# Patient Record
Sex: MALE | Employment: FULL TIME | URBAN - METROPOLITAN AREA
[De-identification: names, ages, dates, MRNs, and addresses within clinical notes are randomized per-mention and may not be internally consistent; named-entity substitution may affect disease eponyms.]

---

## 2020-06-19 ENCOUNTER — TELEPHONE (OUTPATIENT)
Dept: NEUROSURGERY | Facility: CLINIC | Age: 40
End: 2020-06-19

## 2020-06-19 ENCOUNTER — TELEPHONE (OUTPATIENT)
Dept: NEUROLOGY | Facility: CLINIC | Age: 40
End: 2020-06-19

## 2020-11-04 DIAGNOSIS — Z00.00 HEALTH CARE MAINTENANCE: Primary | ICD-10-CM

## 2020-11-11 ENCOUNTER — TELEPHONE (OUTPATIENT)
Dept: FAMILY MEDICINE CLINIC | Facility: CLINIC | Age: 40
End: 2020-11-11

## 2020-11-12 ENCOUNTER — HOSPITAL ENCOUNTER (OUTPATIENT)
Dept: NON INVASIVE DIAGNOSTICS | Facility: CLINIC | Age: 40
Discharge: HOME | End: 2020-11-12

## 2020-11-12 ENCOUNTER — OFFICE VISIT (OUTPATIENT)
Dept: FAMILY MEDICINE CLINIC | Facility: CLINIC | Age: 40
End: 2020-11-12

## 2020-11-12 ENCOUNTER — TRANSCRIBE ORDERS (OUTPATIENT)
Dept: LAB | Facility: CLINIC | Age: 40
End: 2020-11-12

## 2020-11-12 ENCOUNTER — APPOINTMENT (OUTPATIENT)
Dept: LAB | Facility: CLINIC | Age: 40
End: 2020-11-12

## 2020-11-12 ENCOUNTER — HOSPITAL ENCOUNTER (OUTPATIENT)
Dept: ULTRASOUND IMAGING | Facility: HOSPITAL | Age: 40
Discharge: HOME | End: 2020-11-12

## 2020-11-12 ENCOUNTER — HOSPITAL ENCOUNTER (OUTPATIENT)
Dept: CT IMAGING | Facility: HOSPITAL | Age: 40
Discharge: HOME | End: 2020-11-12

## 2020-11-12 VITALS
WEIGHT: 183 LBS | BODY MASS INDEX: 27.74 KG/M2 | HEART RATE: 78 BPM | HEIGHT: 68 IN | RESPIRATION RATE: 12 BRPM | TEMPERATURE: 96.6 F | OXYGEN SATURATION: 99 % | SYSTOLIC BLOOD PRESSURE: 112 MMHG | DIASTOLIC BLOOD PRESSURE: 74 MMHG

## 2020-11-12 DIAGNOSIS — Z00.00 HEALTH CARE MAINTENANCE: ICD-10-CM

## 2020-11-12 DIAGNOSIS — B36.0 TINEA VERSICOLOR: ICD-10-CM

## 2020-11-12 DIAGNOSIS — Z00.00 HEALTH CARE MAINTENANCE: Primary | ICD-10-CM

## 2020-11-12 DIAGNOSIS — E78.2 MIXED HYPERLIPIDEMIA: ICD-10-CM

## 2020-11-12 DIAGNOSIS — M75.42 IMPINGEMENT SYNDROME OF LEFT SHOULDER: ICD-10-CM

## 2020-11-12 DIAGNOSIS — M79.672 LEFT FOOT PAIN: ICD-10-CM

## 2020-11-12 LAB
25(OH)D3 SERPL-MCNC: 50.9 NG/ML (ref 30–100)
ALBUMIN SERPL BCP-MCNC: 4 G/DL (ref 3.5–5)
ALP SERPL-CCNC: 79 U/L (ref 46–116)
ALT SERPL W P-5'-P-CCNC: 31 U/L (ref 12–78)
ANION GAP SERPL CALCULATED.3IONS-SCNC: 5 MMOL/L (ref 4–13)
AST SERPL W P-5'-P-CCNC: 17 U/L (ref 5–45)
ATRIAL RATE: 64 BPM
BACTERIA UR QL AUTO: NORMAL /HPF
BASOPHILS # BLD AUTO: 0.05 THOUSANDS/ΜL (ref 0–0.1)
BASOPHILS NFR BLD AUTO: 1 % (ref 0–1)
BILIRUB SERPL-MCNC: 0.82 MG/DL (ref 0.2–1)
BILIRUB UR QL STRIP: NEGATIVE
BUN SERPL-MCNC: 13 MG/DL (ref 5–25)
CALCIUM SERPL-MCNC: 9 MG/DL (ref 8.3–10.1)
CHEST PAIN STATEMENT: NORMAL
CHLORIDE SERPL-SCNC: 103 MMOL/L (ref 100–108)
CHOLEST SERPL-MCNC: 223 MG/DL (ref 50–200)
CLARITY UR: CLEAR
CO2 SERPL-SCNC: 30 MMOL/L (ref 21–32)
COLOR UR: YELLOW
CREAT SERPL-MCNC: 0.92 MG/DL (ref 0.6–1.3)
CRP SERPL HS-MCNC: <0.9 MG/L
EOSINOPHIL # BLD AUTO: 0.17 THOUSAND/ΜL (ref 0–0.61)
EOSINOPHIL NFR BLD AUTO: 3 % (ref 0–6)
ERYTHROCYTE [DISTWIDTH] IN BLOOD BY AUTOMATED COUNT: 11.6 % (ref 11.6–15.1)
EST. AVERAGE GLUCOSE BLD GHB EST-MCNC: 114 MG/DL
GFR SERPL CREATININE-BSD FRML MDRD: 104 ML/MIN/1.73SQ M
GLUCOSE P FAST SERPL-MCNC: 96 MG/DL (ref 65–99)
GLUCOSE UR STRIP-MCNC: NEGATIVE MG/DL
HBA1C MFR BLD: 5.6 %
HCT VFR BLD AUTO: 44 % (ref 36.5–49.3)
HDLC SERPL-MCNC: 72 MG/DL
HGB BLD-MCNC: 14.5 G/DL (ref 12–17)
HGB UR QL STRIP.AUTO: NEGATIVE
IMM GRANULOCYTES # BLD AUTO: 0.01 THOUSAND/UL (ref 0–0.2)
IMM GRANULOCYTES NFR BLD AUTO: 0 % (ref 0–2)
KETONES UR STRIP-MCNC: NEGATIVE MG/DL
LDLC SERPL CALC-MCNC: 133 MG/DL (ref 0–100)
LEUKOCYTE ESTERASE UR QL STRIP: NEGATIVE
LYMPHOCYTES # BLD AUTO: 2.63 THOUSANDS/ΜL (ref 0.6–4.47)
LYMPHOCYTES NFR BLD AUTO: 40 % (ref 14–44)
MAX DIASTOLIC BP: 88 MMHG
MAX HEART RATE: 181 BPM
MAX PREDICTED HEART RATE: 180 BPM
MAX. SYSTOLIC BP: 148 MMHG
MCH RBC QN AUTO: 30.5 PG (ref 26.8–34.3)
MCHC RBC AUTO-ENTMCNC: 33 G/DL (ref 31.4–37.4)
MCV RBC AUTO: 92 FL (ref 82–98)
MONOCYTES # BLD AUTO: 0.43 THOUSAND/ΜL (ref 0.17–1.22)
MONOCYTES NFR BLD AUTO: 7 % (ref 4–12)
NEUTROPHILS # BLD AUTO: 3.28 THOUSANDS/ΜL (ref 1.85–7.62)
NEUTS SEG NFR BLD AUTO: 49 % (ref 43–75)
NITRITE UR QL STRIP: NEGATIVE
NON-SQ EPI CELLS URNS QL MICRO: NORMAL /HPF
NRBC BLD AUTO-RTO: 0 /100 WBCS
P AXIS: 24 DEGREES
PH UR STRIP.AUTO: 6.5 [PH]
PLATELET # BLD AUTO: 200 THOUSANDS/UL (ref 149–390)
PMV BLD AUTO: 10.6 FL (ref 8.9–12.7)
POTASSIUM SERPL-SCNC: 3.9 MMOL/L (ref 3.5–5.3)
PR INTERVAL: 148 MS
PROT SERPL-MCNC: 7.5 G/DL (ref 6.4–8.2)
PROT UR STRIP-MCNC: NEGATIVE MG/DL
PROTOCOL NAME: NORMAL
PSA SERPL-MCNC: 0.4 NG/ML (ref 0–4)
QRS AXIS: 56 DEGREES
QRSD INTERVAL: 86 MS
QT INTERVAL: 376 MS
QTC INTERVAL: 387 MS
RBC # BLD AUTO: 4.76 MILLION/UL (ref 3.88–5.62)
RBC #/AREA URNS AUTO: NORMAL /HPF
REASON FOR TERMINATION: NORMAL
SODIUM SERPL-SCNC: 138 MMOL/L (ref 136–145)
SP GR UR STRIP.AUTO: 1.01 (ref 1–1.03)
T WAVE AXIS: 22 DEGREES
TARGET HR FORMULA: NORMAL
TEST INDICATION: NORMAL
TIME IN EXERCISE PHASE: NORMAL
TRIGL SERPL-MCNC: 88 MG/DL
TSH SERPL DL<=0.05 MIU/L-ACNC: 1.48 UIU/ML (ref 0.36–3.74)
UROBILINOGEN UR QL STRIP.AUTO: 0.2 E.U./DL
VENTRICULAR RATE: 64 BPM
WBC # BLD AUTO: 6.57 THOUSAND/UL (ref 4.31–10.16)
WBC #/AREA URNS AUTO: NORMAL /HPF

## 2020-11-12 PROCEDURE — 86141 C-REACTIVE PROTEIN HS: CPT

## 2020-11-12 PROCEDURE — VASC: Performed by: SURGERY

## 2020-11-12 PROCEDURE — 93922 UPR/L XTREMITY ART 2 LEVELS: CPT

## 2020-11-12 PROCEDURE — 93306 TTE W/DOPPLER COMPLETE: CPT | Performed by: INTERNAL MEDICINE

## 2020-11-12 PROCEDURE — 76700 US EXAM ABDOM COMPLETE: CPT

## 2020-11-12 PROCEDURE — 80053 COMPREHEN METABOLIC PANEL: CPT

## 2020-11-12 PROCEDURE — 83036 HEMOGLOBIN GLYCOSYLATED A1C: CPT

## 2020-11-12 PROCEDURE — 93010 ELECTROCARDIOGRAM REPORT: CPT | Performed by: INTERNAL MEDICINE

## 2020-11-12 PROCEDURE — 82306 VITAMIN D 25 HYDROXY: CPT

## 2020-11-12 PROCEDURE — 81001 URINALYSIS AUTO W/SCOPE: CPT

## 2020-11-12 PROCEDURE — 93306 TTE W/DOPPLER COMPLETE: CPT

## 2020-11-12 PROCEDURE — 75571 CT HRT W/O DYE W/CA TEST: CPT

## 2020-11-12 PROCEDURE — 36415 COLL VENOUS BLD VENIPUNCTURE: CPT

## 2020-11-12 PROCEDURE — 99499EX: Performed by: FAMILY MEDICINE

## 2020-11-12 PROCEDURE — 85025 COMPLETE CBC W/AUTO DIFF WBC: CPT

## 2020-11-12 PROCEDURE — 93350 STRESS TTE ONLY: CPT

## 2020-11-12 PROCEDURE — 84153 ASSAY OF PSA TOTAL: CPT

## 2020-11-12 PROCEDURE — 80061 LIPID PANEL: CPT

## 2020-11-12 PROCEDURE — 93351 STRESS TTE COMPLETE: CPT | Performed by: INTERNAL MEDICINE

## 2020-11-12 PROCEDURE — 84443 ASSAY THYROID STIM HORMONE: CPT

## 2020-11-12 PROCEDURE — G1004 CDSM NDSC: HCPCS

## 2020-11-12 PROCEDURE — 93005 ELECTROCARDIOGRAM TRACING: CPT

## 2020-11-12 RX ORDER — CLOTRIMAZOLE AND BETAMETHASONE DIPROPIONATE 10; .64 MG/G; MG/G
CREAM TOPICAL 2 TIMES DAILY
Qty: 30 G | Refills: 0 | Status: SHIPPED | OUTPATIENT
Start: 2020-11-12

## 2020-11-12 RX ORDER — MELATONIN
1000 DAILY
COMMUNITY

## 2020-11-12 RX ORDER — ASCORBIC ACID 500 MG
500 TABLET ORAL DAILY
COMMUNITY

## 2020-11-12 RX ORDER — KETOCONAZOLE 20 MG/ML
1 SHAMPOO TOPICAL DAILY
Qty: 120 ML | Refills: 1 | Status: SHIPPED | OUTPATIENT
Start: 2020-11-12

## 2020-11-19 ENCOUNTER — TELEPHONE (OUTPATIENT)
Dept: FAMILY MEDICINE CLINIC | Facility: CLINIC | Age: 40
End: 2020-11-19

## 2022-09-21 ENCOUNTER — HOSPITAL ENCOUNTER (OUTPATIENT)
Dept: RADIOLOGY | Facility: HOSPITAL | Age: 42
Discharge: HOME | End: 2022-09-21
Payer: COMMERCIAL

## 2022-09-21 ENCOUNTER — OFFICE VISIT (OUTPATIENT)
Dept: URGENT CARE | Facility: CLINIC | Age: 42
End: 2022-09-21
Payer: COMMERCIAL

## 2022-09-21 VITALS — HEIGHT: 68 IN | WEIGHT: 177.2 LBS | BODY MASS INDEX: 26.86 KG/M2 | OXYGEN SATURATION: 99 % | HEART RATE: 74 BPM

## 2022-09-21 VITALS — DIASTOLIC BLOOD PRESSURE: 65 MMHG | SYSTOLIC BLOOD PRESSURE: 125 MMHG | HEART RATE: 76 BPM | RESPIRATION RATE: 16 BRPM

## 2022-09-21 DIAGNOSIS — M25.561 RIGHT KNEE PAIN, UNSPECIFIED CHRONICITY: Primary | ICD-10-CM

## 2022-09-21 DIAGNOSIS — M25.561 RIGHT KNEE PAIN, UNSPECIFIED CHRONICITY: ICD-10-CM

## 2022-09-21 DIAGNOSIS — S89.91XA INJURY OF RIGHT KNEE, INITIAL ENCOUNTER: Primary | ICD-10-CM

## 2022-09-21 DIAGNOSIS — M54.50 ACUTE BILATERAL LOW BACK PAIN WITHOUT SCIATICA: ICD-10-CM

## 2022-09-21 DIAGNOSIS — M25.461 EFFUSION OF RIGHT KNEE: ICD-10-CM

## 2022-09-21 DIAGNOSIS — S83.91XA SPRAIN OF RIGHT KNEE, UNSPECIFIED LIGAMENT, INITIAL ENCOUNTER: ICD-10-CM

## 2022-09-21 PROCEDURE — 73562 X-RAY EXAM OF KNEE 3: CPT

## 2022-09-21 PROCEDURE — 99203 OFFICE O/P NEW LOW 30 MIN: CPT | Performed by: PHYSICIAN ASSISTANT

## 2022-09-21 PROCEDURE — G0382 LEV 3 HOSP TYPE B ED VISIT: HCPCS | Performed by: NURSE PRACTITIONER

## 2022-09-21 RX ORDER — IBUPROFEN 600 MG/1
600 TABLET ORAL EVERY 6 HOURS PRN
Qty: 30 TABLET | Refills: 0 | Status: SHIPPED | OUTPATIENT
Start: 2022-09-21

## 2022-09-21 RX ORDER — METHOCARBAMOL 500 MG/1
500 TABLET, FILM COATED ORAL 4 TIMES DAILY
Qty: 20 TABLET | Refills: 0 | Status: SHIPPED | OUTPATIENT
Start: 2022-09-21

## 2022-09-21 NOTE — PATIENT INSTRUCTIONS
Wear knee brace for comfort  Use crutches for comfort   Heat to the back   Ice to your knee 20 min every 2-3 hours   Tylenol/motrin as needed for pain   Robaxin every 6 hours as needed for low back pain  This may cause drowsiness  Follow up with orthopedics     Swollen Knee Joint   WHAT YOU NEED TO KNOW:   A swollen knee joint may be caused by arthritis or by an injury or trauma, such as a knee sprain  It may also happen if you exercise too much  It may be painful to bend or straighten your knee, or walk  DISCHARGE INSTRUCTIONS:   Return to the emergency department if:   Your knee locks or gives way and you fall  Your feet or toes start to look pale or feel cold  You cannot bear weight on your leg, or you have severe pain even after treatment  Contact your healthcare provider if:   You have a fever  You have redness or warmth over your knee  The swelling does not decrease with treatment  It gets harder or more painful to straighten your leg at the knee  Your knee weakens, or you continue to limp  You have questions or concerns about your condition or care  Medicines:   NSAIDs , such as ibuprofen, help decrease swelling, pain, and fever  This medicine is available with or without a doctor's order  NSAIDs can cause stomach bleeding or kidney problems in certain people  If you take blood thinner medicine, always ask your healthcare provider if NSAIDs are safe for you  Always read the medicine label and follow directions  Take your medicine as directed  Contact your healthcare provider if you think your medicine is not helping or if you have side effects  Tell him of her if you are allergic to any medicine  Keep a list of the medicines, vitamins, and herbs you take  Include the amounts, and when and why you take them  Bring the list or the pill bottles to follow-up visits  Carry your medicine list with you in case of an emergency      What you can do to manage your symptoms:   Rest your knee  Avoid activities that make the swelling or pain worse  You may need to avoid putting weight on your knee while you have pain  Crutches, a cane, or a walker can be used to avoid putting weight on your knee while it heals  Apply ice to your knee to help relieve pain and swelling  Apply ice for 15 to 20 minutes every hour or as directed  Use an ice pack, or put crushed ice in a plastic bag  Cover it with a towel before you apply it to your knee  Ice helps prevent tissue damage and decreases swelling and pain  Compress your knee with a brace or bandage to help reduce swelling  Use a brace or bandage only as directed  Elevate your knee above the level of your heart as often as you can  This will help decrease swelling and pain  Prop your joint on pillows or blankets to keep it elevated comfortably  Apply heat to your knee to relieve pain  Apply heat for 20 to 30 minutes every 2 hours for as many days as directed  Heat helps decrease pain  Go to physical therapy if directed  A physical therapist teaches you exercises to help improve movement and strength, and to decrease pain  Follow up with your doctor as directed:  Write down your questions so you remember to ask them during your visits  © Copyright [employer] 2022 Information is for End User's use only and may not be sold, redistributed or otherwise used for commercial purposes  All illustrations and images included in CareNotes® are the copyrighted property of [employer]  or Aliza Kilgore  The above information is an  only  It is not intended as medical advice for individual conditions or treatments  Talk to your doctor, nurse or pharmacist before following any medical regimen to see if it is safe and effective for you

## 2022-09-21 NOTE — PATIENT INSTRUCTIONS
Ice Pack Application   WHAT YOU NEED TO KNOW:   Ice can be used to decrease swelling and pain after an injury or surgery  Common injuries that may benefit from ice therapy are sprains, strains, and bruises  The use of ice is most effective in the first 1 to 3 days after an injury  DISCHARGE INSTRUCTIONS:   How to apply ice:   Fill a bag with crushed ice about half full  Remove the air from the bag before you close it  You can also use a bag of frozen vegetables  Wrap the ice pack in a cloth to protect your skin from frostbite or other injury  Put the ice over the injured area for 20 to 30 minutes or as long as directed  Check your skin after about 30 seconds for color changes or blistering  Remove the ice if you notice skin changes or you feel burning or numbness in the area  Throw the ice pack away after use  Apply ice to your injured area 4 times each day or as directed  Ask your healthcare provider how many days you should apply ice  Contact your healthcare provider if:   You see blisters, whitening of your skin, or a bluish color to your skin after using ice  You feel burning or numbness when using ice  You have questions about the use of ice packs  © 2017 [address] Information is for End User's use only and may not be sold, redistributed or otherwise used for commercial purposes  All illustrations and images included in CareNotes® are the copyrighted property of [employer]  or [facility]  The above information is an  only  It is not intended as medical advice for individual conditions or treatments  Talk to your doctor, nurse or pharmacist before following any medical regimen to see if it is safe and effective for you  Safe Use of NSAIDs   WHAT YOU NEED TO KNOW:   NSAIDs are medicines that are used to decrease pain, swelling, and fever  NSAIDs are available with or without a doctor's order   NSAIDs that you can buy without a doctor's order include aspirin, ibuprofen, and naproxen  DISCHARGE INSTRUCTIONS:   Return to the emergency department if:   You have swelling around your mouth or trouble breathing  You are breathing fast or you have a fast heartbeat  You have nausea, vomiting, or abdominal pain  You have blood in your vomit or bowel movements  You have a seizure  Contact your healthcare provider if:   You have a headache or become confused  You develop hearing loss or ringing in your ears  You develop itching, a rash, or hives  You have swelling around your lower legs, feet, ankles, and hands  You do not know how much NSAIDs to give to your child  You have questions or concerns about your condition or care  How to give NSAIDs to your child safely:   Read the directions on the label  Find out if the medicine is right for your child's age and how much to give to your child  The dose for your child's weight or age should be listed  Do not  give your child more than the recommended amount  Use the measuring tool that came with the medicine  Do not  use another measuring tool, such as a kitchen spoon  Other measuring tools do not provide the right amount of medicine  How to take NSAIDs safely:   Read the directions on the label to learn how much medicine you should take and often to take it  Do not take more than the recommended amount  Talk to your healthcare provider if you need take NSAIDs for more than 30 days  The longer you take NSAIDs, the higher your risk of side effects will be  You may need to take other medicines to decrease your risk of side effects such as stomach bleeding  Do not take an over-the-counter NSAIDs with prescription NSAIDs  The combined amount of NSAIDs may be too high  Tell your healthcare provider about other medicines you take  Some medicines can increase the risk of side effects from NSAIDs   Your healthcare provider will tell you if it is okay to take NSAIDs and how to take them  Who should not take NSAIDs:  Certain people should avoid or limit NSAIDs  Do not  give NSAIDs to children under 10months of age without direction from your child's doctor  Do not give aspirin to children under 25years of age  Your child could develop Reye syndrome if he takes aspirin  Reye syndrome can cause life-threatening brain and liver damage  Check your child's medicine labels for aspirin, salicylates, or oil of wintergreen  Talk to your healthcare provider before you take NSAIDs if any of the following apply to you: You have reflux disease, a peptic ulcer, H pylori infection, or bleeding in your stomach or intestines  You have a bleeding disorder, or you take blood-thinning medicine  You are allergic to aspirin or other NSAIDs  You have liver or kidney or disease  You have high blood pressure or heart disease  You have 3 or more alcoholic drinks each day  You are pregnant  What you need to know about an NSAID overdose:  Certain health problems can occur if you take too much NSAID medicine at one time or over time  Problems include nausea, vomiting, and abdominal pain  You may develop gastritis, peptic ulcers, and stomach bleeding  You may also develop fluid retention, heart problems, and kidney problems  NSAIDs can worsen high blood pressure  You may become confused, or you may have a headache, hearing loss, or hallucinations  An overdose of aspirin may also cause rapid breathing, a rapid heartbeat, or seizures  What to do if you think you or your child took too much NSAID medicine:  Call the [facility] at [phone] immediately  © 2017 [address]  Information is for End User's use only and may not be sold, redistributed or otherwise used for commercial purposes  All illustrations and images included in CareNotes® are the copyrighted property of A D A M , Inc  or [facility]    The above information is an  only  It is not intended as medical advice for individual conditions or treatments  Talk to your doctor, nurse or pharmacist before following any medical regimen to see if it is safe and effective for you

## 2022-09-21 NOTE — PROGRESS NOTES
Assessment/Plan   Diagnoses and all orders for this visit:    Right knee pain, unspecified chronicity    Sprain of right knee, unspecified ligament, initial encounter    Effusion of right knee    - MRI attn ACL, MCL, medial meniscus  - Start PT  - Ice as needed  - Ibuprofen as needed  - Crutches until walking without a limp  - Wear the hinged brace for walking  - Follow up with Dr Flynn Edgar or Dr Tl Sam after the MRI          Subjective   Patient ID: Jame Caldwell is a 43 y o  male  Vitals:    09/21/22 1549   Pulse: 74   SpO2: 99%     41yo male comes in for an evaluation of his right knee  He was injured on 9/16/22 when he twisted it while playing basketball  He had immediate pain and had to stop playing  He saw urgent care earlier today and xrays were negative for fracture  He has a hinged brace and crutches  He is also taking ibuprofen  The pain is dull in character, mild in severity, pain does not radiate and is not associated with numbness  The following portions of the patient's history were reviewed and updated as appropriate: allergies, current medications, past family history, past medical history, past social history, past surgical history and problem list     Review of Systems  Ortho Exam  History reviewed  No pertinent past medical history  History reviewed  No pertinent surgical history  Family History   Problem Relation Age of Onset    Hyperlipidemia Mother     Cancer Mother     Coronary artery disease Father     Diabetes Father     Hyperlipidemia Father      Social History     Occupational History    Not on file   Tobacco Use    Smoking status: Never Smoker    Smokeless tobacco: Never Used   Vaping Use    Vaping Use: Never used   Substance and Sexual Activity    Alcohol use: Not Currently    Drug use: Never    Sexual activity: Not on file       Review of Systems   Constitutional: Negative  HENT: Negative  Eyes: Negative  Respiratory: Negative      Cardiovascular: Negative  Gastrointestinal: Negative  Endocrine: Negative  Genitourinary: Negative  Musculoskeletal: As below      Allergic/Immunologic: Negative  Neurological: Negative  Hematological: Negative  Psychiatric/Behavioral: Negative  Objective   Physical Exam    · Constitutional: Awake, Alert, Oriented  · Eyes: EOMI  · Psych: Mood and affect appropriate  · Heart: regular rate   · Lungs: No audible wheezing  · Abdomen: No guarding  · Lymph: no lymphedema   right Knee:  - Appearance   Swelling: mild, no discoloration, no deformity, no ecchymosis and no erythema  - Effusion   mild  - Palpation   + Tenderness medial joint line and MCL   No tenderness of the patella, patellar tendon, pes anserine, lateral joint line,  LCL, medial / lateral plateau medial / lateral tibial plateau   - ROM   Extension: 10 and Flexion: 110  - Special Tests   ++Pain and mild laxity with valgus   Equivocal lachman with guarding   + Katia   Anterior Drawer Test negative, Posterior Drawer Test negative, Varus Stress Test negative and Patellar apprehension negative  - Motor   normal 5/5 in all planes  - NVI distally    I have personally reviewed pertinent films in PACS and my interpretation is no acute displaced fracture on xray

## 2022-09-21 NOTE — PROGRESS NOTES
[facility]        NAME: Amanda Clinton is a 43 y o  male  : 1980    MRN: [id number]  DATE: 2022  TIME: 3:10 PM    Assessment and Plan   Injury of right knee, initial encounter [S89 91XA]  1  Injury of right knee, initial encounter  Ambulatory Referral to Orthopedic Surgery    ibuprofen (MOTRIN) 600 mg tablet   2  Acute bilateral low back pain without sciatica  ibuprofen (MOTRIN) 600 mg tablet    methocarbamol (ROBAXIN) 500 mg tablet     E-prescribing is down, prescriptions called to [facility] as patient requested  Knee braced applied, crutches dispensed (and instructed on use by RN), ortho referral placed  Patient Instructions   Wear knee brace for comfort  Use crutches for comfort   Heat to the back   Ice to your knee 20 min every 2-3 hours   Tylenol/motrin as needed for pain   Robaxin every 6 hours as needed for low back pain  This may cause drowsiness  Follow up with orthopedics [phone]    Follow up with PCP in 3-5 days  Proceed to  ER if symptoms worsen  Chief Complaint     Chief Complaint   Patient presents with    Knee Injury     R knee injury5 days ago playing basketball, ice, Motrin Still painful   Back Pain     States he also started with back pain after playing basketball         History of Present Illness       Patient is a 43year old male presenting with right knee injury  He sustained a twisting injury 5 days ago while playing basketball  He has been able to ambulate with pain  He has been using an OTC brace and ibuprofen  He has applied ice and heat  He states that over the past few days he developed low back pain that he attributes to antalgic gait  Review of Systems   Review of Systems   Constitutional: Negative for activity change, chills and fever  Musculoskeletal: Positive for arthralgias, back pain and joint swelling  Skin: Negative for color change and rash  Neurological: Negative for weakness and headaches           Current Medications       Current Outpatient Medications:     ascorbic acid (VITAMIN C) 500 mg tablet, Take 500 mg by mouth daily, Disp: , Rfl:     b complex vitamins tablet, Take 1 tablet by mouth daily, Disp: , Rfl:     cholecalciferol (VITAMIN D3) 1,000 units tablet, Take 1,000 Units by mouth daily, Disp: , Rfl:     ibuprofen (MOTRIN) 600 mg tablet, Take 1 tablet (600 mg total) by mouth every 6 (six) hours as needed for mild pain, Disp: 30 tablet, Rfl: 0    methocarbamol (ROBAXIN) 500 mg tablet, Take 1 tablet (500 mg total) by mouth 4 (four) times a day, Disp: 20 tablet, Rfl: 0    clotrimazole-betamethasone (LOTRISONE) 1-0 05 % cream, Apply topically 2 (two) times a day (Patient not taking: No sig reported), Disp: 30 g, Rfl: 0    ketoconazole (NIZORAL) 2 % shampoo, Apply 1 application topically daily (Patient not taking: No sig reported), Disp: 120 mL, Rfl: 1    Current Allergies     Allergies as of 09/21/2022    (No Known Allergies)            The following portions of the patient's history were reviewed and updated as appropriate: allergies, current medications, past family history, past medical history, past social history, past surgical history and problem list      History reviewed  No pertinent past medical history  History reviewed  No pertinent surgical history  Family History   Problem Relation Age of Onset    Hyperlipidemia Mother     Cancer Mother     Coronary artery disease Father     Diabetes Father     Hyperlipidemia Father          Medications have been verified  Objective   /65 (Patient Position: Sitting)   Pulse 76   Resp 16          Physical Exam     Physical Exam  Vitals reviewed  Constitutional:       General: He is awake  He is not in acute distress  Appearance: Normal appearance  He is normal weight  Cardiovascular:      Rate and Rhythm: Normal rate     Pulmonary:      Effort: Pulmonary effort is normal    Musculoskeletal:      Lumbar back: Tenderness present  Back:       Right knee: Swelling present  No effusion, erythema, ecchymosis, lacerations or bony tenderness  Decreased range of motion (unable to fully extend knee  )  Tenderness (medial tenderness) present over the MCL  Skin:     General: Skin is warm and moist    Neurological:      Mental Status: He is alert  Psychiatric:         Behavior: Behavior is cooperative  Orthopedic injury treatment    Date/Time: 9/21/2022 3:13 PM  Performed by: DENI Dominguez  Authorized by: DENI Dominguez     Patient Location:  [facility] Protocol:  Consent given by: patient  Patient understanding: patient states understanding of the procedure being performed  Required items: required blood products, implants, devices, and special equipment available  Patient identity confirmed: verbally with patient      Injury location:  Knee  Location details:  Right knee  Injury type:   Soft tissue  Neurovascular status: Neurovascularly intact    Distal perfusion: normal    Neurological function: normal    Range of motion: reduced    Local anesthesia used?: No    Immobilization:  Brace and crutches (dynamic hinged knee brace)  Neurovascular status: Neurovascularly intact    Distal perfusion: normal    Neurological function: normal    Range of motion: unchanged    Patient tolerance:  Patient tolerated the procedure well with no immediate complications

## 2022-09-27 ENCOUNTER — HOSPITAL ENCOUNTER (OUTPATIENT)
Dept: RADIOLOGY | Facility: IMAGING CENTER | Age: 42
Discharge: HOME | End: 2022-09-27
Payer: COMMERCIAL

## 2022-09-27 DIAGNOSIS — M25.461 EFFUSION OF RIGHT KNEE: ICD-10-CM

## 2022-09-27 DIAGNOSIS — M25.561 RIGHT KNEE PAIN, UNSPECIFIED CHRONICITY: ICD-10-CM

## 2022-09-27 DIAGNOSIS — S83.91XA SPRAIN OF RIGHT KNEE, UNSPECIFIED LIGAMENT, INITIAL ENCOUNTER: ICD-10-CM

## 2022-09-27 PROCEDURE — G1004 CDSM NDSC: HCPCS

## 2022-09-27 PROCEDURE — 73721 MRI JNT OF LWR EXTRE W/O DYE: CPT

## 2022-09-30 VITALS
BODY MASS INDEX: 26.83 KG/M2 | HEIGHT: 68 IN | SYSTOLIC BLOOD PRESSURE: 107 MMHG | WEIGHT: 177 LBS | HEART RATE: 101 BPM | DIASTOLIC BLOOD PRESSURE: 71 MMHG

## 2022-09-30 DIAGNOSIS — S83.411A SPRAIN OF MEDIAL COLLATERAL LIGAMENT OF RIGHT KNEE, INITIAL ENCOUNTER: Primary | ICD-10-CM

## 2022-09-30 DIAGNOSIS — S89.91XA INJURY OF RIGHT KNEE, INITIAL ENCOUNTER: ICD-10-CM

## 2022-09-30 PROCEDURE — 99204 OFFICE O/P NEW MOD 45 MIN: CPT | Performed by: STUDENT IN AN ORGANIZED HEALTH CARE EDUCATION/TRAINING PROGRAM

## 2022-09-30 NOTE — PROGRESS NOTES
Ortho Sports Medicine Knee New Patient Visit     Assesment:   43 y o  male right knee Grade 2 MCL sprain    Plan:  The patient's diagnosis and treatment were discussed at length today  We discussed no treatment, non-operative treatment, and operative treatment  Patient examined findings are consistent with grade 2 MCL sprain  We did discuss surgical versus nonsurgical intervention  I discussed with him that this typically takes 4-6 weeks to heal   If after 4-6 weeks of rest still experiencing instability we may consider surgical intervention  He is able to weight bear as tolerated  He should continue to use hinged knee brace as needed  He is able to do light walking or stationary bike to help with range of motion  I suggested another 2 weeks of rest and then beginning physical therapy which referral was placed at today's visit  He can incorporate back exercises due to muscle spasms from antalgic gait  Ice and over-the-counter medication as needed for pain relief  Follow-up in 4 weeks  Conservative treatment:    Ice to knee for 20 minutes at least 1-2 times daily  PT for ROM/strengthening to knee, hip and core  OTC NSAIDS prn for pain  Continue use of knee sleeve  Weight-bearing as tolerated  Continue with hinged knee brace  Imaging: All imaging from today was reviewed by myself and explained to the patient  Injection:    No Injection planned at this time  Surgery:     No surgery is recommended at this point, continue with conservative treatment plan as noted  Follow up:    Return in about 4 weeks (around 10/28/2022) for Recheck  Chief Complaint   Patient presents with    Right Knee - Follow-up, Pain       History of Present Illness: The patient is a 43 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, referred to me by their primary care physician, seen in clinic for evaluation of right knee pain        Knee Surgical History:  None    Past Medical, Social and Family History:  History reviewed  No pertinent past medical history  History reviewed  No pertinent surgical history  No Known Allergies  Current Outpatient Medications on File Prior to Visit   Medication Sig Dispense Refill    ascorbic acid (VITAMIN C) 500 mg tablet Take 500 mg by mouth daily      b complex vitamins tablet Take 1 tablet by mouth daily      cholecalciferol (VITAMIN D3) 1,000 units tablet Take 1,000 Units by mouth daily      ibuprofen (MOTRIN) 600 mg tablet Take 1 tablet (600 mg total) by mouth every 6 (six) hours as needed for mild pain 30 tablet 0    methocarbamol (ROBAXIN) 500 mg tablet Take 1 tablet (500 mg total) by mouth 4 (four) times a day 20 tablet 0    clotrimazole-betamethasone (LOTRISONE) 1-0 05 % cream Apply topically 2 (two) times a day (Patient not taking: No sig reported) 30 g 0    ketoconazole (NIZORAL) 2 % shampoo Apply 1 application topically daily (Patient not taking: No sig reported) 120 mL 1     No current facility-administered medications on file prior to visit       Social History     Socioeconomic History    Marital status: /Civil Union     Spouse name: Not on file    Number of children: Not on file    Years of education: Not on file    Highest education level: Not on file   Occupational History    Not on file   Tobacco Use    Smoking status: Never Smoker    Smokeless tobacco: Never Used   Vaping Use    Vaping Use: Never used   Substance and Sexual Activity    Alcohol use: Not Currently    Drug use: Never    Sexual activity: Not on file   Other Topics Concern    Not on file   Social History Narrative    Not on file     Social Determinants of Health     Financial Resource Strain: Not on file   Food Insecurity: Not on file   Transportation Needs: Not on file   Physical Activity: Not on file   Stress: Not on file   Social Connections: Not on file   Intimate Partner Violence: Not on file   Housing Stability: Not on file         I have reviewed the past medical, surgical, social and family history, medications and allergies as documented in the EMR  Review of systems: ROS is negative other than that noted in the HPI  Constitutional: Negative for fatigue and fever  HENT: Negative for sore throat  Respiratory: Negative for shortness of breath  Cardiovascular: Negative for chest pain  Gastrointestinal: Negative for abdominal pain  Endocrine: Negative for cold intolerance and heat intolerance  Genitourinary: Negative for flank pain  Musculoskeletal: Negative for back pain  Skin: Negative for rash  Allergic/Immunologic: Negative for immunocompromised state  Neurological: Negative for dizziness  Psychiatric/Behavioral: Negative for agitation  Physical Exam:    Blood pressure 107/71, pulse 101, height 5' 8" (1 727 m), weight 80 3 kg (177 lb)  General/Constitutional: NAD, well developed, well nourished  HENT: Normocephalic, atraumatic  CV: Intact distal pulses, regular rate  Resp: No respiratory distress or labored breathing  Lymphatic: No lymphadenopathy palpated  Neuro: Alert and Oriented x 3, no focal deficits  Psych: Normal mood, normal affect, normal judgement, normal behavior  Skin: Warm, dry, no rashes, no erythema      Knee Exam (focused):  Visual inspection of the Right knee demonstrates normal contour without atrophy  No previous incisions   There is no significant erythema or edema  No significant joint effusion   Range of motion is full from 0-130 degrees of flexion   Able to straight leg raise   No patellar tenderness  Mild medial epicondylar tenderness  Mild genu Valgum  No medial joint line tenderness, No lateral joint line tenderness  Negative medial Katia's, Negative lateral Katia's  Stable Lachman exam, Stable posterior drawer  No dial test  Grade 2 opening to varus stress @ 30 degrees, Grade 1+ when compared bilaterally   Patella tracks normally  No J sign  No apprehension    Translation is approximately 2 quadrants and is equal to the contralateral side  Patellar eversion is similar to the contralateral side    Examination of the patient's ipsilateral hip demonstrates full painless range of motion  No crepitus  LE NV Exam: +2 DP/PT pulses bilaterally  Sensation intact to light touch L2-S1 bilaterally     Bilateral hip ROM demonstrates no pain actively or passively    No calf tenderness to palpation bilaterally    Knee Imaging      MRI of the right knee  were reviewed, which demonstrate grade 2 MCL sprain with edema throughout the course of the MCL as well as longitudinal tearing  There were fibers intact     I have reviewed the radiology report and agree with their impression        Scribe Attestation    I,:  Corrine Long am acting as a scribe while in the presence of the attending physician :       I,:  Amalia Oneill, DO personally performed the services described in this documentation    as scribed in my presence :

## 2023-02-19 ENCOUNTER — APPOINTMENT (EMERGENCY)
Dept: RADIOLOGY | Facility: HOSPITAL | Age: 43
End: 2023-02-19

## 2023-02-19 ENCOUNTER — HOSPITAL ENCOUNTER (EMERGENCY)
Facility: HOSPITAL | Age: 43
Discharge: HOME | End: 2023-02-19
Attending: EMERGENCY MEDICINE | Admitting: EMERGENCY MEDICINE

## 2023-02-19 VITALS
SYSTOLIC BLOOD PRESSURE: 102 MMHG | DIASTOLIC BLOOD PRESSURE: 64 MMHG | HEART RATE: 67 BPM | RESPIRATION RATE: 18 BRPM | OXYGEN SATURATION: 97 % | TEMPERATURE: 98 F

## 2023-02-19 DIAGNOSIS — K62.5 RECTAL BLEEDING: ICD-10-CM

## 2023-02-19 DIAGNOSIS — R07.89 CHEST WALL PAIN: Primary | ICD-10-CM

## 2023-02-19 DIAGNOSIS — M79.672 LEFT FOOT PAIN: ICD-10-CM

## 2023-02-19 LAB
ALBUMIN SERPL BCP-MCNC: 4 G/DL (ref 3.5–5)
ALP SERPL-CCNC: 70 U/L (ref 34–104)
ALT SERPL W P-5'-P-CCNC: 17 U/L (ref 7–52)
ANION GAP SERPL CALCULATED.3IONS-SCNC: 6 MMOL/L (ref 4–13)
AST SERPL W P-5'-P-CCNC: 15 U/L (ref 13–39)
ATRIAL RATE: 73 BPM
BASOPHILS # BLD AUTO: 0.06 THOUSANDS/ÂΜL (ref 0–0.1)
BASOPHILS NFR BLD AUTO: 1 % (ref 0–1)
BILIRUB SERPL-MCNC: 0.36 MG/DL (ref 0.2–1)
BUN SERPL-MCNC: 16 MG/DL (ref 5–25)
CALCIUM SERPL-MCNC: 9 MG/DL (ref 8.4–10.2)
CARDIAC TROPONIN I PNL SERPL HS: 2 NG/L
CHLORIDE SERPL-SCNC: 105 MMOL/L (ref 96–108)
CO2 SERPL-SCNC: 27 MMOL/L (ref 21–32)
CREAT SERPL-MCNC: 1 MG/DL (ref 0.6–1.3)
D DIMER PPP FEU-MCNC: <0.27 UG/ML FEU
EOSINOPHIL # BLD AUTO: 0.23 THOUSAND/ÂΜL (ref 0–0.61)
EOSINOPHIL NFR BLD AUTO: 3 % (ref 0–6)
ERYTHROCYTE [DISTWIDTH] IN BLOOD BY AUTOMATED COUNT: 11.6 % (ref 11.6–15.1)
GFR SERPL CREATININE-BSD FRML MDRD: 92 ML/MIN/1.73SQ M
GLUCOSE SERPL-MCNC: 91 MG/DL (ref 65–140)
HCT VFR BLD AUTO: 40.5 % (ref 36.5–49.3)
HGB BLD-MCNC: 13.3 G/DL (ref 12–17)
IMM GRANULOCYTES # BLD AUTO: 0.01 THOUSAND/UL (ref 0–0.2)
IMM GRANULOCYTES NFR BLD AUTO: 0 % (ref 0–2)
LYMPHOCYTES # BLD AUTO: 3.18 THOUSANDS/ÂΜL (ref 0.6–4.47)
LYMPHOCYTES NFR BLD AUTO: 41 % (ref 14–44)
MCH RBC QN AUTO: 30 PG (ref 26.8–34.3)
MCHC RBC AUTO-ENTMCNC: 32.8 G/DL (ref 31.4–37.4)
MCV RBC AUTO: 91 FL (ref 82–98)
MONOCYTES # BLD AUTO: 0.55 THOUSAND/ÂΜL (ref 0.17–1.22)
MONOCYTES NFR BLD AUTO: 7 % (ref 4–12)
NEUTROPHILS # BLD AUTO: 3.8 THOUSANDS/ÂΜL (ref 1.85–7.62)
NEUTS SEG NFR BLD AUTO: 48 % (ref 43–75)
NRBC BLD AUTO-RTO: 0 /100 WBCS
P AXIS: 41 DEGREES
PLATELET # BLD AUTO: 210 THOUSANDS/UL (ref 149–390)
PMV BLD AUTO: 10.9 FL (ref 8.9–12.7)
POTASSIUM SERPL-SCNC: 3.8 MMOL/L (ref 3.5–5.3)
PR INTERVAL: 128 MS
PROT SERPL-MCNC: 6.8 G/DL (ref 6.4–8.4)
QRS AXIS: 52 DEGREES
QRSD INTERVAL: 80 MS
QT INTERVAL: 350 MS
QTC INTERVAL: 385 MS
RBC # BLD AUTO: 4.43 MILLION/UL (ref 3.88–5.62)
SODIUM SERPL-SCNC: 138 MMOL/L (ref 135–147)
T WAVE AXIS: 28 DEGREES
VENTRICULAR RATE: 73 BPM
WBC # BLD AUTO: 7.83 THOUSAND/UL (ref 4.31–10.16)

## 2023-02-19 RX ORDER — IBUPROFEN 600 MG/1
600 TABLET ORAL EVERY 6 HOURS PRN
Qty: 20 TABLET | Refills: 0 | Status: SHIPPED | OUTPATIENT
Start: 2023-02-19

## 2023-02-19 RX ORDER — KETOROLAC TROMETHAMINE 30 MG/ML
10 INJECTION, SOLUTION INTRAMUSCULAR; INTRAVENOUS ONCE
Status: COMPLETED | OUTPATIENT
Start: 2023-02-19 | End: 2023-02-19

## 2023-02-19 RX ORDER — LIDOCAINE 50 MG/G
1 PATCH TOPICAL ONCE
Status: DISCONTINUED | OUTPATIENT
Start: 2023-02-19 | End: 2023-02-20 | Stop reason: HOSPADM

## 2023-02-19 RX ADMIN — LIDOCAINE 5% 1 PATCH: 700 PATCH TOPICAL at 22:37

## 2023-02-19 RX ADMIN — KETOROLAC TROMETHAMINE 9.9 MG: 30 INJECTION, SOLUTION INTRAMUSCULAR at 22:05

## 2023-02-20 NOTE — DISCHARGE INSTRUCTIONS
Take ibuprofen 600 mg orally up to every 6 hours as needed for chest discomfort  You may apply warm compresses/heating pad to the area for 15 to 20-minute intervals and additionally use over-the-counter lidocaine patch and/or acetaminophen  If you do not appreciate relief in chest discomfort or have worsening while using ibuprofen consider using an over-the-counter H2 blocker such as ranitidine or famotidine as directed on packaging which can help to limit stomach acid  Please schedule a follow-up appointment with Your primary care physician Dr Barbara Rodas for reevaluation in the next couple of weeks  Please schedule an appointment with either gastroenterology or colorectal specialist for further evaluation of your rectal discomfort and bleeding  You may use over-the-counter hydrocortisone 1% 3 times daily if needed for discomfort  You may follow-up with podiatry and/or orthopedics for further evaluation/treatment of foot pain  Return if you have significant worsening or new concerns

## 2023-02-22 NOTE — ED PROVIDER NOTES
History  Chief Complaint   Patient presents with   • Chest Pain     Left sided chest pain  No nausea/vomitting  Off and on for several days but sometimes it just gets intense  Patient is a 45-year-old male who presents to the emergency department concerned regarding left-sided chest discomfort  This started a couple of weeks ago without identified provocation  It varies from mild to intense  At times which causes a tight sensation  At other point it seems to come in waves  He does not correlate it to any particular time of day, activity or ingestion  He notes that he is fairly young and has a high pain tolerance  The persistence concerns him  He notes that it is present most of the time  Occasionally when busy he does not specifically notice the discomfort  Discomfort is not pleuritic  It does not worsen with exertion  He has not appreciated any shortness of breath, lightheadedness or dizziness with this  He does at times appreciate reflux and notes that some foods he consumes exacerbate this  No diaphoresis or fevers  No cough  He has been under much stress recently having recently lost a job  He had also been lifting weights with workouts which he has discontinued since pain started  He additionally expresses concern for intermittent rectal discomfort and bleeding  Occasionally with wiping the area is sore and small amounts of bright red blood will be noted on tissue  Stools vary between loose and occasionally larger caliber/firm  No weight change  He has not had a colonoscopy  He also expresses concern for discomfort in the left foot  This is ongoing centrally and followed an injury a couple of years ago  He describes having had large amount of swelling after twisting the foot  He did not seek medical attention at that time as he had a planned trip out of the country immediately following the injury  No recent travel/immobilization   Past medical history significant for hyperlipidemia-identified on executive care physical a couple of years ago  Family history significant for CAD only in advanced age  Prior to Admission Medications   Prescriptions Last Dose Informant Patient Reported? Taking?   ascorbic acid (VITAMIN C) 500 mg tablet   Yes No   Sig: Take 500 mg by mouth daily   b complex vitamins tablet   Yes No   Sig: Take 1 tablet by mouth daily   cholecalciferol (VITAMIN D3) 1,000 units tablet   Yes No   Sig: Take 1,000 Units by mouth daily   clotrimazole-betamethasone (LOTRISONE) 1-0 05 % cream   No No   Sig: Apply topically 2 (two) times a day   Patient not taking: No sig reported   ibuprofen (MOTRIN) 600 mg tablet   No No   Sig: Take 1 tablet (600 mg total) by mouth every 6 (six) hours as needed for mild pain   ketoconazole (NIZORAL) 2 % shampoo   No No   Sig: Apply 1 application topically daily   Patient not taking: No sig reported   methocarbamol (ROBAXIN) 500 mg tablet   No No   Sig: Take 1 tablet (500 mg total) by mouth 4 (four) times a day      Facility-Administered Medications: None       History reviewed  No pertinent past medical history  History reviewed  No pertinent surgical history  Family History   Problem Relation Age of Onset   • Hyperlipidemia Mother    • Cancer Mother    • Coronary artery disease Father    • Diabetes Father    • Hyperlipidemia Father      I have reviewed and agree with the history as documented  E-Cigarette/Vaping   • E-Cigarette Use Never User      E-Cigarette/Vaping Substances     Social History     Tobacco Use   • Smoking status: Never   • Smokeless tobacco: Never   Vaping Use   • Vaping Use: Never used   Substance Use Topics   • Alcohol use: Not Currently   • Drug use: Never       Review of Systems   Constitutional: Negative for fever  Respiratory: Negative for cough and shortness of breath  All other systems reviewed and are negative  Physical Exam  Physical Exam  Vitals and nursing note reviewed  Constitutional:       Appearance: He is well-developed  He is not ill-appearing or diaphoretic  Comments: Appropriately anxious regarding possible causes of discomfort   HENT:      Head: Normocephalic  Cardiovascular:      Rate and Rhythm: Normal rate and regular rhythm  Heart sounds: Normal heart sounds  Pulmonary:      Effort: Pulmonary effort is normal       Breath sounds: Normal breath sounds  No decreased breath sounds or wheezing  Chest:      Chest wall: Tenderness ( focally L lower anterior chest wall  No overlying skin changes) present  No deformity or crepitus  Abdominal:      General: Bowel sounds are normal       Palpations: Abdomen is soft  Tenderness: There is no abdominal tenderness  Genitourinary:     Comments: No external hemorrhoids or fissure visualized  No stricture, tenderness or mass appreciated on digital exam   Stool Hemoccult negative  Musculoskeletal:         General: Normal range of motion  Cervical back: Neck supple  Right lower leg: No tenderness  No edema  Left lower leg: No tenderness  No edema  Comments: +2 PT pulses  There is tenderness over the left midfoot without deformity or discoloration  Ankle, toes and lateral metatarsals nontender  Full range of motion  Sensation intact  Skin:     General: Skin is warm and dry  Neurological:      General: No focal deficit present  Mental Status: He is alert and oriented to person, place, and time     Psychiatric:         Mood and Affect: Mood normal          Behavior: Behavior normal          Vital Signs  ED Triage Vitals   Temperature Pulse Respirations Blood Pressure SpO2   02/19/23 1934 02/19/23 1931 02/19/23 1931 02/19/23 1931 02/19/23 1931   98 °F (36 7 °C) 71 18 151/72 98 %      Temp Source Heart Rate Source Patient Position - Orthostatic VS BP Location FiO2 (%)   02/19/23 1931 02/19/23 1931 02/19/23 1931 02/19/23 1931 --   Oral Monitor Lying Right arm       Pain Score 02/19/23 1931       8           Vitals:    02/19/23 1931 02/19/23 2100 02/19/23 2200   BP: 151/72 110/68 102/64   Pulse: 71 65 67   Patient Position - Orthostatic VS: Lying           Visual Acuity      ED Medications  Medications   ketorolac (TORADOL) injection 9 9 mg (9 9 mg Intravenous Given 2/19/23 2205)       Diagnostic Studies  Results Reviewed     Procedure Component Value Units Date/Time    D-Dimer [[id number]]  (Normal) Collected: 02/19/23 2043    Lab Status: Final result Specimen: Blood from Arm, Left Updated: 02/19/23 2141     D-Dimer, Quant <0 27 ug/ml FEU     HS Troponin 0hr (reflex protocol) [[id number]]  (Normal) Collected: 02/19/23 2043    Lab Status: Final result Specimen: Blood from Arm, Left Updated: 02/19/23 2131     hs TnI 0hr 2 ng/L     Comprehensive metabolic panel [[id number]] Collected: 02/19/23 2043    Lab Status: Final result Specimen: Blood from Arm, Left Updated: 02/19/23 2123     Sodium 138 mmol/L      Potassium 3 8 mmol/L      Chloride 105 mmol/L      CO2 27 mmol/L      ANION GAP 6 mmol/L      BUN 16 mg/dL      Creatinine 1 00 mg/dL      Glucose 91 mg/dL      Calcium 9 0 mg/dL      AST 15 U/L      ALT 17 U/L      Alkaline Phosphatase 70 U/L      Total Protein 6 8 g/dL      Albumin 4 0 g/dL      Total Bilirubin 0 36 mg/dL      eGFR 92 ml/min/1 73sq m     Narrative:      Samy guidelines for Chronic Kidney Disease (CKD):   •  Stage 1 with normal or high GFR (GFR > 90 mL/min/1 73 square meters)  •  Stage 2 Mild CKD (GFR = 60-89 mL/min/1 73 square meters)  •  Stage 3A Moderate CKD (GFR = 45-59 mL/min/1 73 square meters)  •  Stage 3B Moderate CKD (GFR = 30-44 mL/min/1 73 square meters)  •  Stage 4 Severe CKD (GFR = 15-29 mL/min/1 73 square meters)  •  Stage 5 End Stage CKD (GFR <15 mL/min/1 73 square meters)  Note: GFR calculation is accurate only with a steady state creatinine    CBC and differential [[id number]] Collected: 02/19/23 2043    Lab Status: Final result Specimen: Blood from Arm, Left Updated: 02/19/23 2058     WBC 7 83 Thousand/uL      RBC 4 43 Million/uL      Hemoglobin 13 3 g/dL      Hematocrit 40 5 %      MCV 91 fL      MCH 30 0 pg      MCHC 32 8 g/dL      RDW 11 6 %      MPV 10 9 fL      Platelets 341 Thousands/uL      nRBC 0 /100 WBCs      Neutrophils Relative 48 %      Immat GRANS % 0 %      Lymphocytes Relative 41 %      Monocytes Relative 7 %      Eosinophils Relative 3 %      Basophils Relative 1 %      Neutrophils Absolute 3 80 Thousands/µL      Immature Grans Absolute 0 01 Thousand/uL      Lymphocytes Absolute 3 18 Thousands/µL      Monocytes Absolute 0 55 Thousand/µL      Eosinophils Absolute 0 23 Thousand/µL      Basophils Absolute 0 06 Thousands/µL                  XR chest 2 views   ED Interpretation by Ginny Landrum MD (02/19 2126)   Unremarkable cardiac silhouette  Clear lungs  Final Result by Porsha Ashford MD (02/20 9340)      No acute cardiopulmonary disease  Findings are stable                  Workstation performed: [id number]         XR foot 3+ views LEFT   ED Interpretation by Ginny Landrum MD (02/19 2126)   No fracture      Final Result by Jael Munguia MD (02/20 1007)      No acute osseous abnormality  This report is in agreement with the preliminary interpretation  Workstation performed: [id number]                    Procedures  Procedures         ED Course     Differential diagnosis of chest discomfort includes but is not limited to ACS, musculoskeletal including costochondritis, rib fracture, pulmonary mass, PE, GERD  Intermittent rectal discomfort and small amounts of bright red blood passage correlating with changes in stool likely related to fissure higher than mucosa visualized or hemorrhoids  Certainly must also consider colonic mass although this is less likely as patient correlates changes in stool to foods consumed    He has not had any concerning weight loss  I did strongly advise that he see either gastroenterologist or colorectal specialist for colonoscopy to further evaluate these concerns  With regard to ongoing left foot pain-suspect this is related to strain  As he did not have any imaging around time of initial injury will obtain x-ray to assess for evidence of possible healed and/or malaligned fracture versus alternate bony lesion  Reviewed findings with patient and wife  EKG and troponin without concerning findings to suggest ACS  D-dimer negative at very low risk for PE  PE excluded  Reproducibility with palpation with history of recent weight lifting make musculoskeletal etiology most likely  Have advised NSAIDs +/- topical lidocaine  Additionally within differential especially as there is some reported GERD symptoms must consider reflux/gastritis as possible cause of this discomfort  If after taking NSAIDs for a few days pain is worsening and/or not improving would consider over-the-counter H2 blocker  With combination of GERD symptoms as well as rectal symptoms strongly advised follow-up with gastroenterology  Contact information additionally being provided for podiatry given chronic left foot pain  No abnormality appreciated on x-ray  Patient demonstrated understanding of all information reviewed and had opportunity to ask questions  He was stable at time of discharge  HEART Risk Score    Flowsheet Row Most Recent Value   Heart Score Risk Calculator    History 1 Filed at: 02/19/2023 2152   ECG 1 Filed at: 02/19/2023 2152   Age 0 Filed at: 02/19/2023 2152   Risk Factors 1 Filed at: 02/19/2023 2152   Troponin 0 Filed at: 02/19/2023 2152   HEART Score 3 Filed at: 02/19/2023 2152                        SBIRT 22yo+    Flowsheet Row Most Recent Value   SBIRT (25 yo +)    In order to provide better care to our patients, we are screening all of our patients for alcohol and drug use   Would it be okay to ask you these screening questions? Yes Filed at: 02/19/2023 2051   Initial Alcohol Screen: US AUDIT-C     1  How often do you have a drink containing alcohol? 0 Filed at: 02/19/2023 2051   2  How many drinks containing alcohol do you have on a typical day you are drinking? 0 Filed at: 02/19/2023 2051   3a  Male UNDER 65: How often do you have five or more drinks on one occasion? 0 Filed at: 02/19/2023 2051   Audit-C Score 0 Filed at: 02/19/2023 2051   GINNY: How many times in the past year have you    Used an illegal drug or used a prescription medication for non-medical reasons? Never Filed at: 02/19/2023 2051                    MDM    Disposition  Final diagnoses:   Chest wall pain   Rectal bleeding   Left foot pain     Time reflects when diagnosis was documented in both MDM as applicable and the Disposition within this note     Time User Action Codes Description Comment    2/19/2023 10:32 PM Britton Tim A Add [R07 89] Chest wall pain     2/19/2023 10:32 PM Britton Tim A Add [K62 5] Rectal bleeding     2/19/2023 10:33 PM Britton Tim Add [O25 093] Left foot pain       ED Disposition     ED Disposition   Discharge    Condition   Stable    Date/Time   Sun Feb 19, 2023  9:53 PM    Comment   Leda Bhandari discharge to home/self care                 Follow-up Information     Follow up With Specialties Details Why Contact Info Additional Information    Rachel Daniel DPM Podiatry, Wound Care  For further evaluation/treatment of left foot pain [address]  [address] [facility] Schedule an appointment as soon as possible for a visit  For further evaluation of your rectal discomfort and bleeding Kam Rosario [address] [facility] [place], [address] [facility] [place], [place], [address]    Your primary care physician, Dr Sarai Dupont               Discharge Medication List as of 2/19/2023 10:41 PM      START taking these medications    Details   !! ibuprofen (MOTRIN) 600 mg tablet Take 1 tablet (600 mg total) by mouth every 6 (six) hours as needed for mild pain or moderate pain, Starting Sun 2/19/2023, Normal       !! - Potential duplicate medications found  Please discuss with provider  CONTINUE these medications which have NOT CHANGED    Details   ascorbic acid (VITAMIN C) 500 mg tablet Take 500 mg by mouth daily, Historical Med      b complex vitamins tablet Take 1 tablet by mouth daily, Historical Med      cholecalciferol (VITAMIN D3) 1,000 units tablet Take 1,000 Units by mouth daily, Historical Med      clotrimazole-betamethasone (LOTRISONE) 1-0 05 % cream Apply topically 2 (two) times a day, Starting Thu 11/12/2020, Normal      !! ibuprofen (MOTRIN) 600 mg tablet Take 1 tablet (600 mg total) by mouth every 6 (six) hours as needed for mild pain, Starting Wed 9/21/2022, Phone In      ketoconazole (NIZORAL) 2 % shampoo Apply 1 application topically daily, Starting Thu 11/12/2020, Normal      methocarbamol (ROBAXIN) 500 mg tablet Take 1 tablet (500 mg total) by mouth 4 (four) times a day, Starting Wed 9/21/2022, Phone In       !! - Potential duplicate medications found  Please discuss with provider  No discharge procedures on file      PDMP Review     None          ED Provider  Electronically Signed by           Alison Galvin MD  02/21/23 6418

## 2024-01-18 ENCOUNTER — NURSE TRIAGE (OUTPATIENT)
Dept: OTHER | Facility: OTHER | Age: 44
End: 2024-01-18

## 2024-01-18 NOTE — TELEPHONE ENCOUNTER
"Reason for Disposition  • All other patients with chest pain (Exception: fleeting chest pain lasting a few seconds)    Answer Assessment - Initial Assessment Questions  1. LOCATION: \"Where does it hurt?\"       Left side of the chest under the breast.   2. RADIATION: \"Does the pain go anywhere else?\" (e.g., into neck, jaw, arms, back)      No   3. ONSET: \"When did the chest pain begin?\" (Minutes, hours or days)       Several months ago, intermittent. Today pain started 45 minutes ago.   4. PATTERN \"Does the pain come and go, or has it been constant since it started?\"  \"Does it get worse with exertion?\"       Intermittent   5. DURATION: \"How long does it last\" (e.g., seconds, minutes, hours)      Minutes to hours.   6. SEVERITY: \"How bad is the pain?\"  (e.g., Scale 1-10; mild, moderate, or severe)     - MILD (1-3): doesn't interfere with normal activities      - MODERATE (4-7): interferes with normal activities or awakens from sleep     - SEVERE (8-10): excruciating pain, unable to do any normal activities        Moderate 6 out of 10   7. CARDIAC RISK FACTORS: \"Do you have any history of heart problems or risk factors for heart disease?\" (e.g., angina, prior heart attack; diabetes, high blood pressure, high cholesterol, smoker, or strong family history of heart disease)      No   8. PULMONARY RISK FACTORS: \"Do you have any history of lung disease?\"  (e.g., blood clots in lung, asthma, emphysema, birth control pills)      No   9. CAUSE: \"What do you think is causing the chest pain?\"      Unknown.   10. OTHER SYMPTOMS: \"Do you have any other symptoms?\" (e.g., dizziness, nausea, vomiting, sweating, fever, difficulty breathing, cough)        No other symptoms.    Protocols used: Chest Pain-ADULT-[place]    "

## 2024-01-18 NOTE — TELEPHONE ENCOUNTER
Patient is currently at work, stated that he would be able to come to the office tomorrow. For patient's request, appointment confirmed for tomorrow 1/19/24 at 0920.

## 2024-01-19 ENCOUNTER — APPOINTMENT (OUTPATIENT)
Dept: LAB | Facility: CLINIC | Age: 44
End: 2024-01-19
Payer: COMMERCIAL

## 2024-01-19 ENCOUNTER — OFFICE VISIT (OUTPATIENT)
Dept: FAMILY MEDICINE CLINIC | Facility: CLINIC | Age: 44
End: 2024-01-19
Payer: COMMERCIAL

## 2024-01-19 ENCOUNTER — APPOINTMENT (OUTPATIENT)
Dept: RADIOLOGY | Facility: CLINIC | Age: 44
End: 2024-01-19
Payer: COMMERCIAL

## 2024-01-19 VITALS
WEIGHT: 189 LBS | SYSTOLIC BLOOD PRESSURE: 122 MMHG | DIASTOLIC BLOOD PRESSURE: 76 MMHG | OXYGEN SATURATION: 99 % | HEART RATE: 84 BPM | BODY MASS INDEX: 28.74 KG/M2

## 2024-01-19 DIAGNOSIS — R07.89 OTHER CHEST PAIN: Primary | ICD-10-CM

## 2024-01-19 DIAGNOSIS — R22.2 MASS OF CHEST WALL, LEFT: ICD-10-CM

## 2024-01-19 DIAGNOSIS — R07.89 COSTOCHONDRAL CHEST PAIN: ICD-10-CM

## 2024-01-19 DIAGNOSIS — E78.2 MIXED HYPERLIPIDEMIA: ICD-10-CM

## 2024-01-19 DIAGNOSIS — Z12.5 SCREENING FOR PROSTATE CANCER: ICD-10-CM

## 2024-01-19 LAB
ALBUMIN SERPL BCP-MCNC: 4.8 G/DL (ref 3.5–5)
ALP SERPL-CCNC: 68 U/L (ref 34–104)
ALT SERPL W P-5'-P-CCNC: 29 U/L (ref 7–52)
ANION GAP SERPL CALCULATED.3IONS-SCNC: 7 MMOL/L
AST SERPL W P-5'-P-CCNC: 21 U/L (ref 13–39)
BILIRUB SERPL-MCNC: 0.7 MG/DL (ref 0.2–1)
BUN SERPL-MCNC: 11 MG/DL (ref 5–25)
CALCIUM SERPL-MCNC: 10 MG/DL (ref 8.4–10.2)
CHLORIDE SERPL-SCNC: 100 MMOL/L (ref 96–108)
CHOLEST SERPL-MCNC: 220 MG/DL
CO2 SERPL-SCNC: 32 MMOL/L (ref 21–32)
CREAT SERPL-MCNC: 0.89 MG/DL (ref 0.6–1.3)
GFR SERPL CREATININE-BSD FRML MDRD: 104 ML/MIN/1.73SQ M
GLUCOSE P FAST SERPL-MCNC: 88 MG/DL (ref 65–99)
HDLC SERPL-MCNC: 69 MG/DL
LDLC SERPL CALC-MCNC: 137 MG/DL (ref 0–100)
NONHDLC SERPL-MCNC: 151 MG/DL
POTASSIUM SERPL-SCNC: 4.4 MMOL/L (ref 3.5–5.3)
PROT SERPL-MCNC: 7.8 G/DL (ref 6.4–8.4)
PSA SERPL-MCNC: 0.41 NG/ML (ref 0–4)
SODIUM SERPL-SCNC: 139 MMOL/L (ref 135–147)
TRIGL SERPL-MCNC: 70 MG/DL

## 2024-01-19 PROCEDURE — G0103 PSA SCREENING: HCPCS

## 2024-01-19 PROCEDURE — 99204 OFFICE O/P NEW MOD 45 MIN: CPT | Performed by: FAMILY MEDICINE

## 2024-01-19 PROCEDURE — 36415 COLL VENOUS BLD VENIPUNCTURE: CPT

## 2024-01-19 PROCEDURE — 71046 X-RAY EXAM CHEST 2 VIEWS: CPT

## 2024-01-19 PROCEDURE — 80053 COMPREHEN METABOLIC PANEL: CPT | Performed by: FAMILY MEDICINE

## 2024-01-19 PROCEDURE — 80061 LIPID PANEL: CPT | Performed by: FAMILY MEDICINE

## 2024-01-19 PROCEDURE — 93000 ELECTROCARDIOGRAM COMPLETE: CPT | Performed by: FAMILY MEDICINE

## 2024-01-19 NOTE — PROGRESS NOTES
Subjective:      Patient ID: Robert Tellez is a 43 y.o. male.    HPI    Patient is here reporting symptoms of left-sided discomfort.  This has been an ongoing problem for which he was evaluated in February 23.  ER visit reviewed.  Patient describes it as a tight sensation which comes and goes.  Symptoms are mostly related to posture, sleeping on the side.   Patient states that he plays basketball and works out in the gym.  He usually stops these activities when he develops chest pain.  Sometimes he feels a lump in the area, worried about cancer.  Recently symptoms were associated with some nausea because of pain.  Patient has not experienced any dizziness or diaphoresis during these episodes.  Metabolic labs and cardiac testing in his chart reviewed with patient today.  Has mild dyslipidemia with LDL of 133.  No labs since.      No past medical history on file.    Family History   Problem Relation Age of Onset    Hyperlipidemia Mother     Cancer Mother     Coronary artery disease Father     Diabetes Father     Hyperlipidemia Father        No past surgical history on file.     reports that he has never smoked. He has never used smokeless tobacco. He reports that he does not currently use alcohol. He reports that he does not use drugs.      Current Outpatient Medications:     ascorbic acid (VITAMIN C) 500 mg tablet, Take 500 mg by mouth daily, Disp: , Rfl:     b complex vitamins tablet, Take 1 tablet by mouth daily, Disp: , Rfl:     cholecalciferol (VITAMIN D3) 1,000 units tablet, Take 1,000 Units by mouth daily, Disp: , Rfl:     ibuprofen (MOTRIN) 600 mg tablet, Take 1 tablet (600 mg total) by mouth every 6 (six) hours as needed for mild pain, Disp: 30 tablet, Rfl: 0    clotrimazole-betamethasone (LOTRISONE) 1-0.05 % cream, Apply topically 2 (two) times a day (Patient not taking: Reported on 9/21/2022), Disp: 30 g, Rfl: 0    ibuprofen (MOTRIN) 600 mg tablet, Take 1 tablet (600 mg total) by mouth every 6 (six) hours as  needed for mild pain or moderate pain (Patient not taking: Reported on 1/19/2024), Disp: 20 tablet, Rfl: 0    ketoconazole (NIZORAL) 2 % shampoo, Apply 1 application topically daily (Patient not taking: Reported on 9/21/2022), Disp: 120 mL, Rfl: 1    methocarbamol (ROBAXIN) 500 mg tablet, Take 1 tablet (500 mg total) by mouth 4 (four) times a day (Patient not taking: Reported on 1/19/2024), Disp: 20 tablet, Rfl: 0    The following portions of the patient's history were reviewed and updated as appropriate: allergies, current medications, past family history, past medical history, past social history, past surgical history and problem list.    Review of Systems   Constitutional: Negative.    Respiratory: Negative.     Cardiovascular:  Positive for chest pain.           Objective:    /76   Pulse 84   Wt 85.7 kg (189 lb)   SpO2 99%   BMI 28.74 kg/m²      Physical Exam  Constitutional:       Appearance: He is well-developed.   Cardiovascular:      Heart sounds: Normal heart sounds.   Pulmonary:      Effort: Pulmonary effort is normal.      Breath sounds: Normal breath sounds.   Chest:          Comments: Reproducible tenderness of the costochondral junction.   Patient reported lump in the area, none noted on exam today.   Musculoskeletal:         General: Tenderness present.           No results found for this or any previous visit (from the past 1008 hour(s)).    Assessment/Plan:    No problem-specific Assessment & Plan notes found for this encounter.           Problem List Items Addressed This Visit          Other    Mixed hyperlipidemia     Metabolic labs from 2020 reviewed with patient.  Noted to have mild dyslipidemia with elevated LDL of 133.  No labs since.  Recommended metabolic panel to follow-up on dyslipidemia         Relevant Orders    Comprehensive metabolic panel    Lipid panel    Costochondral chest pain - Primary     Patient noted to have significant point tenderness at the costochondral junction  of the lower ribs.  Was evaluated in the ER for similar symptoms in February 2023.  At the time his cardiac enzymes and other cardiac workup including EKG was normal.  Patient has undergone extensive cardiac testing in 2020 which was reassuring.  Patient presented today worried about symptoms worsening overnight.  Physically active, plays basketball and other sports.  EKG was done in the office to rule out acute cardiac issues.  The EKG appears to be unchanged since last year.  Advised to start over-the-counter NSAID and to try it consistently for 2 weeks.  Avoid physical activity which  involves heavy lifting/exercise.  Patient can use topical NSAID or IcyHot and gentle stretching of the chest wall muscles.           Relevant Medications    Diclofenac Sodium (VOLTAREN) 1 %    Other Relevant Orders    XR chest pa & lateral    Mass of chest wall, left     Patient is concerned about persisting tenderness over the left chest wall.  States that sometimes he can feel a lump in the area which is especially tender.  No lumps felt on evaluation today.  Ordered a diagnostic mammo and ultrasound of the left breast.  Patient will be contacted with results.         Relevant Orders    Mammo diagnostic left w cad    US breast left limited (diagnostic)    Screening for prostate cancer    Relevant Orders    PSA, Total Screen     I have spent a total time of 45 minutes on 01/19/24 in caring for this patient including Diagnostic results, Prognosis, Risks and benefits of tx options, Instructions for management, Patient and family education, Importance of tx compliance, Risk factor reductions, Impressions, Counseling / Coordination of care, Documenting in the medical record, Reviewing / ordering tests, medicine, procedures  , Obtaining or reviewing history  , and Communicating with other healthcare professionals .  I have informed about patient's visit today, clinical evaluation and the rationale of testing to his PCP, he will be  establishing care with Dr. Anderson in February.  Patient understands that if symptoms of chest pain worsen progressively and/or changes then he needs to go to the ER for testing of cardiac enzymes.

## 2024-01-19 NOTE — ASSESSMENT & PLAN NOTE
Metabolic labs from 2020 reviewed with patient.  Noted to have mild dyslipidemia with elevated LDL of 133.  No labs since.  Recommended metabolic panel to follow-up on dyslipidemia

## 2024-01-19 NOTE — ASSESSMENT & PLAN NOTE
Patient noted to have significant point tenderness at the costochondral junction of the lower ribs.  Was evaluated in the ER for similar symptoms in February 2023.  At the time his cardiac enzymes and other cardiac workup including EKG was normal.  Patient has undergone extensive cardiac testing in 2020 which was reassuring.  Patient presented today worried about symptoms worsening overnight.  Physically active, plays basketball and other sports.  EKG was done in the office to rule out acute cardiac issues.  The EKG appears to be unchanged since last year.  Advised to start over-the-counter NSAID and to try it consistently for 2 weeks.  Avoid physical activity which  involves heavy lifting/exercise.  Patient can use topical NSAID or IcyHot and gentle stretching of the chest wall muscles.

## 2024-01-19 NOTE — ASSESSMENT & PLAN NOTE
Patient is concerned about persisting tenderness over the left chest wall.  States that sometimes he can feel a lump in the area which is especially tender.  No lumps felt on evaluation today.  Ordered a diagnostic mammo and ultrasound of the left breast.  Patient will be contacted with results.

## 2024-01-25 ENCOUNTER — TELEPHONE (OUTPATIENT)
Dept: FAMILY MEDICINE CLINIC | Facility: CLINIC | Age: 44
End: 2024-01-25

## 2024-01-25 NOTE — TELEPHONE ENCOUNTER
----- Message from Elvira Singh MD sent at 1/24/2024  2:00 PM EST -----  Please call patient with normal results.  Noted to have small event ration of the right hemidiaphragm, which is an incidental finding, and should not be of any clinical significance

## 2024-01-30 ENCOUNTER — TELEPHONE (OUTPATIENT)
Dept: OTHER | Facility: OTHER | Age: 44
End: 2024-01-30

## 2024-01-30 ENCOUNTER — TELEPHONE (OUTPATIENT)
Dept: FAMILY MEDICINE CLINIC | Facility: CLINIC | Age: 44
End: 2024-01-30

## 2024-01-30 ENCOUNTER — HOSPITAL ENCOUNTER (OUTPATIENT)
Dept: ULTRASOUND IMAGING | Facility: CLINIC | Age: 44
Discharge: HOME | End: 2024-01-30
Payer: COMMERCIAL

## 2024-01-30 ENCOUNTER — HOSPITAL ENCOUNTER (OUTPATIENT)
Dept: MAMMOGRAPHY | Facility: CLINIC | Age: 44
Discharge: HOME | End: 2024-01-30
Payer: COMMERCIAL

## 2024-01-30 ENCOUNTER — NURSE TRIAGE (OUTPATIENT)
Dept: OTHER | Facility: OTHER | Age: 44
End: 2024-01-30

## 2024-01-30 DIAGNOSIS — R22.2 MASS OF CHEST WALL, LEFT: ICD-10-CM

## 2024-01-30 DIAGNOSIS — R22.2 MASS OF LEFT CHEST WALL: ICD-10-CM

## 2024-01-30 DIAGNOSIS — N64.4 BREAST PAIN, LEFT: ICD-10-CM

## 2024-01-30 PROCEDURE — 77066 DX MAMMO INCL CAD BI: CPT

## 2024-01-30 PROCEDURE — G0279 TOMOSYNTHESIS, MAMMO: HCPCS

## 2024-01-30 PROCEDURE — 76642 ULTRASOUND BREAST LIMITED: CPT

## 2024-01-30 NOTE — TELEPHONE ENCOUNTER
----- Message from Elvira Singh MD sent at 1/30/2024 12:22 PM EST -----  Please call patient with normal results.

## 2024-01-31 ENCOUNTER — TELEPHONE (OUTPATIENT)
Dept: FAMILY MEDICINE CLINIC | Facility: CLINIC | Age: 44
End: 2024-01-31

## 2024-02-05 ENCOUNTER — OFFICE VISIT (OUTPATIENT)
Dept: FAMILY MEDICINE CLINIC | Facility: CLINIC | Age: 44
End: 2024-02-05
Payer: COMMERCIAL

## 2024-02-05 VITALS
OXYGEN SATURATION: 97 % | HEART RATE: 78 BPM | WEIGHT: 186 LBS | RESPIRATION RATE: 16 BRPM | SYSTOLIC BLOOD PRESSURE: 116 MMHG | DIASTOLIC BLOOD PRESSURE: 78 MMHG | BODY MASS INDEX: 28.19 KG/M2 | HEIGHT: 68 IN

## 2024-02-05 DIAGNOSIS — R07.89 COSTOCHONDRAL CHEST PAIN: ICD-10-CM

## 2024-02-05 DIAGNOSIS — R07.2 PRECORDIAL PAIN: Primary | ICD-10-CM

## 2024-02-05 PROCEDURE — 99215 OFFICE O/P EST HI 40 MIN: CPT | Performed by: FAMILY MEDICINE

## 2024-02-05 RX ORDER — PREDNISONE 20 MG/1
40 TABLET ORAL DAILY
Qty: 10 TABLET | Refills: 0 | Status: SHIPPED | OUTPATIENT
Start: 2024-02-05 | End: 2024-02-10

## 2024-02-05 NOTE — ASSESSMENT & PLAN NOTE
Patient continues to experience tenderness at the left lower costochondral junction.     His workup so far includes initial cardiac evaluation in the ER in February 2023.  Evaluation by cardiology with cardiac testing.  In office EKG, chest x-ray, diagnostic breast mammogram and ultrasound were all reassuring.  Patient continues to experience very local tenderness at the costochondral junction, over his lower ribs.   Symptoms are reproducible to palpation. Patient worried about an old injury/ soft tissue swelling.  Has been using nsaid/ voltaren gel.   Requesting MRI chest wall/evaluation by . Also concerned about the health of his heart. I have advised him to revisit his cardiologist.   If all of the above work up is negative, and reassuring, but symptoms persist, then he can consult pain management to discuss a local nerve block for symptom relief.

## 2024-02-05 NOTE — PROGRESS NOTES
Subjective:      Patient ID: Robert Tellez is a 43 y.o. male.    HPI    Patient is following up for symptoms of left chest discomfort.  This has been an ongoing problem since 2020, was evaluated by cardiologist  at the ER in 2023.  At the time his workup was reassuring and he was diagnosed with costochondritis.  Since symptoms persisted he was evaluated in the office.  His workup up till now includes a repeat EKG, chest x-ray, breast diagnostic mammogram and ultrasound which were all reassuring.   Patient is advised to start NSAIDs for pain relief however he has not experienced much improvement in his symptoms.  Symptoms are consistent, related to posture, driving.  Patient states that he used to play sports actively he is not sure if he had a soft tissue injury while playing sports, which is not healing.  Symptoms have not progressed or worsened since his most recent evaluation.      History reviewed. No pertinent past medical history.    Family History   Problem Relation Age of Onset    Hyperlipidemia Mother     Cancer Mother     Coronary artery disease Father     Diabetes Father     Hyperlipidemia Father        History reviewed. No pertinent surgical history.     reports that he has never smoked. He has never used smokeless tobacco. He reports that he does not currently use alcohol. He reports that he does not use drugs.      Current Outpatient Medications:     ascorbic acid (VITAMIN C) 500 mg tablet, Take 500 mg by mouth daily, Disp: , Rfl:     b complex vitamins tablet, Take 1 tablet by mouth daily, Disp: , Rfl:     cholecalciferol (VITAMIN D3) 1,000 units tablet, Take 1,000 Units by mouth daily, Disp: , Rfl:     Diclofenac Sodium (VOLTAREN) 1 %, Apply 2 g topically 4 (four) times a day, Disp: 50 g, Rfl: 0    ibuprofen (MOTRIN) 600 mg tablet, Take 1 tablet (600 mg total) by mouth every 6 (six) hours as needed for mild pain, Disp: 30 tablet, Rfl: 0    clotrimazole-betamethasone (LOTRISONE) 1-0.05 % cream, Apply  "topically 2 (two) times a day (Patient not taking: Reported on 9/21/2022), Disp: 30 g, Rfl: 0    ibuprofen (MOTRIN) 600 mg tablet, Take 1 tablet (600 mg total) by mouth every 6 (six) hours as needed for mild pain or moderate pain (Patient not taking: Reported on 1/19/2024), Disp: 20 tablet, Rfl: 0    ketoconazole (NIZORAL) 2 % shampoo, Apply 1 application topically daily (Patient not taking: Reported on 9/21/2022), Disp: 120 mL, Rfl: 1    methocarbamol (ROBAXIN) 500 mg tablet, Take 1 tablet (500 mg total) by mouth 4 (four) times a day (Patient not taking: Reported on 1/19/2024), Disp: 20 tablet, Rfl: 0    The following portions of the patient's history were reviewed and updated as appropriate: allergies, current medications, past family history, past medical history, past social history, past surgical history and problem list.    Review of Systems   Constitutional: Negative.    Respiratory: Negative.     Cardiovascular: Negative.         Chest wall pain.            Objective:    /78   Pulse 78   Resp 16   Ht 5' 8\" (1.727 m)   Wt 84.4 kg (186 lb)   SpO2 97%   BMI 28.28 kg/m²      Physical Exam  Constitutional:       Appearance: Normal appearance.   Chest:       Musculoskeletal:         General: Tenderness present.           Recent Results (from the past 1008 hour(s))   Comprehensive metabolic panel    Collection Time: 01/19/24 10:40 AM   Result Value Ref Range    Sodium 139 135 - 147 mmol/L    Potassium 4.4 3.5 - 5.3 mmol/L    Chloride 100 96 - 108 mmol/L    CO2 32 21 - 32 mmol/L    ANION GAP 7 mmol/L    BUN 11 5 - 25 mg/dL    Creatinine 0.89 0.60 - 1.30 mg/dL    Glucose, Fasting 88 65 - 99 mg/dL    Calcium 10.0 8.4 - 10.2 mg/dL    AST 21 13 - 39 U/L    ALT 29 7 - 52 U/L    Alkaline Phosphatase 68 34 - 104 U/L    Total Protein 7.8 6.4 - 8.4 g/dL    Albumin 4.8 3.5 - 5.0 g/dL    Total Bilirubin 0.70 0.20 - 1.00 mg/dL    eGFR 104 ml/min/1.73sq m   Lipid panel    Collection Time: 01/19/24 10:40 AM   Result " Value Ref Range    Cholesterol 220 (H) See Comment mg/dL    Triglycerides 70 See Comment mg/dL    HDL, Direct 69 >=40 mg/dL    LDL Calculated 137 (H) 0 - 100 mg/dL    Non-HDL-Chol (CHOL-HDL) 151 mg/dl   PSA, Total Screen    Collection Time: 01/19/24 10:40 AM   Result Value Ref Range    PSA 0.41 0.00 - 4.00 ng/mL       Assessment/Plan:    No problem-specific Assessment & Plan notes found for this encounter.           Problem List Items Addressed This Visit          Other    Costochondral chest pain     Patient continues to experience tenderness at the left lower costochondral junction.     His workup so far includes initial cardiac evaluation in the ER in February 2023.  Evaluation by cardiology with cardiac testing.  In office EKG, chest x-ray, diagnostic breast mammogram and ultrasound were all reassuring.  Patient continues to experience very local tenderness at the costochondral junction, over his lower ribs.   Symptoms are reproducible to palpation. Patient worried about an old injury/ soft tissue swelling.  Has been using nsaid/ voltaren gel.   Requesting MRI chest wall/evaluation by . Also concerned about the health of his heart. I have advised him to revisit his cardiologist.   If all of the above work up is negative, and reassuring, but symptoms persist, then he can consult pain management to discuss a local nerve block for symptom relief.          Relevant Medications    predniSONE 20 mg tablet    Other Relevant Orders    Ambulatory Referral to Cardiology    Ambulatory referral to Spine & Pain Management    MRI chest wall wo and w contrast    Ambulatory Referral to Sports Medicine    Precordial pain - Primary    Relevant Medications    predniSONE 20 mg tablet    Other Relevant Orders    Ambulatory Referral to Cardiology    Ambulatory referral to Spine & Pain Management    MRI chest wall wo and w contrast    Ambulatory Referral to Sports Medicine     I have spent a total time of 40 minutes  on 02/05/24 in caring for this patient including Diagnostic results, Prognosis, Risks and benefits of tx options, Instructions for management, Patient and family education, Importance of tx compliance, Risk factor reductions, Impressions, Counseling / Coordination of care, Documenting in the medical record, Reviewing / ordering tests, medicine, procedures  , and Obtaining or reviewing history  .

## 2024-02-14 ENCOUNTER — OFFICE VISIT (OUTPATIENT)
Dept: PODIATRY | Facility: CLINIC | Age: 44
End: 2024-02-14
Payer: COMMERCIAL

## 2024-02-14 ENCOUNTER — TELEPHONE (OUTPATIENT)
Dept: OBGYN CLINIC | Facility: HOSPITAL | Age: 44
End: 2024-02-14

## 2024-02-14 VITALS
HEART RATE: 66 BPM | OXYGEN SATURATION: 97 % | WEIGHT: 187 LBS | SYSTOLIC BLOOD PRESSURE: 124 MMHG | BODY MASS INDEX: 28.34 KG/M2 | DIASTOLIC BLOOD PRESSURE: 88 MMHG | HEIGHT: 68 IN

## 2024-02-14 DIAGNOSIS — M79.672 LEFT FOOT PAIN: ICD-10-CM

## 2024-02-14 DIAGNOSIS — M77.42 METATARSALGIA, LEFT FOOT: Primary | ICD-10-CM

## 2024-02-14 PROCEDURE — 99203 OFFICE O/P NEW LOW 30 MIN: CPT | Performed by: PODIATRIST

## 2024-02-14 NOTE — TELEPHONE ENCOUNTER
Caller: Patient    Doctor: Brennan    Reason for call: Patient called to schedule follow up to review MRI scheduled for 2/23/24. Offered first available fu 3/18/24 appt but patient would like a sooner appt.    Call back#: [phone]

## 2024-02-14 NOTE — PATIENT INSTRUCTIONS
Recommend quality over the counter arch supports:    - Powerstep Chester series insoles  - Spenco Orthotic Arch insoles  - Superfeet insoles   - PCSsole 3/4 length insoles

## 2024-02-14 NOTE — PROGRESS NOTES
Assessment/Plan:     The patient's clinical examination today significant for mild to moderate tenderness palpation along the dorsal aspect of the left midfoot.  Palpable spurring is noted at the met cuneiform joints.  Some mild to moderate tenderness is also noted with palpation along the mid shaft of the left second metatarsal.  There is no erythema nor edema no calor or ecchymosis noted to the left foot.    X-rays of the patient's left foot taken in February 2023 were personally reviewed and interpreted.  Some mild arthritic changes are noted with spurring along the mid cuneiform joints noted on the lateral view.  The official report was appreciated and was negative for any acute osseous pathology.    X-ray images were reviewed with the patient.  His symptomatology is most consistent with midfoot DJD.  That would also explain the intermittent nature of his clinical symptomatology.  Treatment options were discussed with the patient.  We will refer him for an MRI of the left midfoot to rule out stress reaction or stress fracture of the metatarsals.  We can consider injection therapy.  In the interim, I have recommended some simple exercises to improve his ankle range of motion to reduce stress of the midfoot joints.  He would also benefit from some good-quality OTC arch supports.  Some recommendations were included in his AVS.    He may continue OTC NSAID therapy or topical Voltaren gel on as-needed basis.  Recommend follow-up in 3 to 4 weeks to review his MRI report.     Diagnoses and all orders for this visit:    Metatarsalgia, left foot  -     MRI foot/forefoot toes left wo contrast; Future    Left foot pain  -     MRI foot/forefoot toes left wo contrast; Future          Subjective:     Patient ID: Robert Tellez is a 43 y.o. male.    The patient presents today for his initial consultation with [facility]'s podiatry group with a chief complaint of left midfoot pain that has been present for over a year.  The issue  was exacerbated around last February when he was playing basketball in a driveway in the twisted his foot and ankle.  He noticed swelling and bruising for several days and it eventually resolved.  He does note persistent lingering tenderness to his left midfoot that is on and off.  There are some days where he has no pain at all.  At present the pain is about a 4-5 out of 10 on the pain scale at its worst can get to a 7 out of 10.  He cannot recall any triggering factors.  Tenderness is typically improved by getting up his feet and resting.  OTC NSAIDs do seem to help when he takes them.      PAST MEDICAL HISTORY:  History reviewed. No pertinent past medical history.    PAST SURGICAL HISTORY:  History reviewed. No pertinent surgical history.     ALLERGIES:  Patient has no known allergies.    MEDICATIONS:  Current Outpatient Medications   Medication Sig Dispense Refill    ascorbic acid (VITAMIN C) 500 mg tablet Take 500 mg by mouth daily      b complex vitamins tablet Take 1 tablet by mouth daily      cholecalciferol (VITAMIN D3) 1,000 units tablet Take 1,000 Units by mouth daily      Diclofenac Sodium (VOLTAREN) 1 % Apply 2 g topically 4 (four) times a day 50 g 0    ibuprofen (MOTRIN) 600 mg tablet Take 1 tablet (600 mg total) by mouth every 6 (six) hours as needed for mild pain 30 tablet 0    clotrimazole-betamethasone (LOTRISONE) 1-0.05 % cream Apply topically 2 (two) times a day (Patient not taking: Reported on 9/21/2022) 30 g 0    ibuprofen (MOTRIN) 600 mg tablet Take 1 tablet (600 mg total) by mouth every 6 (six) hours as needed for mild pain or moderate pain (Patient not taking: Reported on 1/19/2024) 20 tablet 0    ketoconazole (NIZORAL) 2 % shampoo Apply 1 application topically daily (Patient not taking: Reported on 9/21/2022) 120 mL 1    methocarbamol (ROBAXIN) 500 mg tablet Take 1 tablet (500 mg total) by mouth 4 (four) times a day (Patient not taking: Reported on 1/19/2024) 20 tablet 0     No current  facility-administered medications for this visit.       SOCIAL HISTORY:  Social History     Socioeconomic History    Marital status: /Civil Union     Spouse name: None    Number of children: None    Years of education: None    Highest education level: None   Occupational History    None   Tobacco Use    Smoking status: Never    Smokeless tobacco: Never   Vaping Use    Vaping status: Never Used   Substance and Sexual Activity    Alcohol use: Not Currently    Drug use: Never    Sexual activity: None   Other Topics Concern    None   Social History Narrative    None     Social Determinants of Health     Financial Resource Strain: Not on file   Food Insecurity: Not on file   Transportation Needs: Not on file   Physical Activity: Not on file   Stress: Not on file   Social Connections: Not on file   Intimate Partner Violence: Not on file   Housing Stability: Not on file        Review of Systems   Constitutional: Negative.    HENT: Negative.     Eyes: Negative.    Respiratory: Negative.     Cardiovascular: Negative.    Endocrine: Negative.    Musculoskeletal: Negative.    Neurological: Negative.    Hematological: Negative.    Psychiatric/Behavioral: Negative.           Objective:     Physical Exam  Vitals reviewed.   Constitutional:       Appearance: Normal appearance.   HENT:      Head: Normocephalic and atraumatic.      Nose: Nose normal.   Eyes:      Conjunctiva/sclera: Conjunctivae normal.      Pupils: Pupils are equal, round, and reactive to light.   Cardiovascular:      Pulses:           Dorsalis pedis pulses are 2+ on the right side and 2+ on the left side.        Posterior tibial pulses are 2+ on the right side and 2+ on the left side.   Pulmonary:      Effort: Pulmonary effort is normal.   Feet:      Comments: The patient's clinical examination today significant for mild to moderate tenderness palpation along the dorsal aspect of the left midfoot.  Palpable spurring is noted at the met cuneiform joints.   Some mild to moderate tenderness is also noted with palpation along the mid shaft of the left second metatarsal.  There is no erythema nor edema no calor or ecchymosis noted to the left foot.  Skin:     General: Skin is warm.      Capillary Refill: Capillary refill takes less than 2 seconds.   Neurological:      General: No focal deficit present.      Mental Status: He is alert and oriented to person, place, and time.   Psychiatric:         Mood and Affect: Mood normal.         Behavior: Behavior normal.         Thought Content: Thought content normal.

## 2024-02-21 PROBLEM — Z12.5 SCREENING FOR PROSTATE CANCER: Status: RESOLVED | Noted: 2024-01-19 | Resolved: 2024-02-21

## 2024-02-23 ENCOUNTER — HOSPITAL ENCOUNTER (OUTPATIENT)
Dept: MRI IMAGING | Facility: HOSPITAL | Age: 44
End: 2024-02-23
Attending: PODIATRIST
Payer: COMMERCIAL

## 2024-02-23 ENCOUNTER — HOSPITAL ENCOUNTER (OUTPATIENT)
Dept: MRI IMAGING | Facility: HOSPITAL | Age: 44
End: 2024-02-23
Payer: COMMERCIAL

## 2024-02-23 DIAGNOSIS — R07.2 PRECORDIAL PAIN: ICD-10-CM

## 2024-02-23 DIAGNOSIS — R07.89 COSTOCHONDRAL CHEST PAIN: ICD-10-CM

## 2024-02-23 DIAGNOSIS — M77.42 METATARSALGIA, LEFT FOOT: ICD-10-CM

## 2024-02-23 DIAGNOSIS — M79.672 LEFT FOOT PAIN: ICD-10-CM

## 2024-02-23 PROCEDURE — G1004 CDSM NDSC: HCPCS

## 2024-02-23 PROCEDURE — 71552 MRI CHEST W/O & W/DYE: CPT

## 2024-02-23 PROCEDURE — A9585 GADOBUTROL INJECTION: HCPCS | Performed by: FAMILY MEDICINE

## 2024-02-23 PROCEDURE — 73718 MRI LOWER EXTREMITY W/O DYE: CPT

## 2024-02-23 RX ORDER — GADOBUTROL 604.72 MG/ML
8 INJECTION INTRAVENOUS
Status: COMPLETED | OUTPATIENT
Start: 2024-02-23 | End: 2024-02-23

## 2024-02-23 RX ADMIN — GADOBUTROL 8 ML: 604.72 INJECTION INTRAVENOUS at 08:18

## 2024-02-27 ENCOUNTER — OFFICE VISIT (OUTPATIENT)
Dept: OBGYN CLINIC | Facility: CLINIC | Age: 44
End: 2024-02-27
Payer: COMMERCIAL

## 2024-02-27 VITALS
WEIGHT: 187 LBS | DIASTOLIC BLOOD PRESSURE: 73 MMHG | BODY MASS INDEX: 28.34 KG/M2 | HEART RATE: 65 BPM | SYSTOLIC BLOOD PRESSURE: 108 MMHG | HEIGHT: 68 IN

## 2024-02-27 DIAGNOSIS — R07.89 COSTOCHONDRAL CHEST PAIN: ICD-10-CM

## 2024-02-27 PROCEDURE — 99213 OFFICE O/P EST LOW 20 MIN: CPT | Performed by: ORTHOPAEDIC SURGERY

## 2024-02-27 NOTE — PROGRESS NOTES
Assessment/Plan:  1. Costochondral chest pain  Ambulatory Referral to Sports Medicine    Ambulatory Referral to Cardiology        Myles has left-sided chest pain which I can reproduce today.  It is over the left ribs 6 costochondral junction.  This is consistent with costochondritis however it is abnormal to be present for the last year and a half.  I informed her that there is very few treatment options for this other than topical treatments and activity modifications.  I recommended oral anti-inflammatory medications.  He can try topical lidocaine patches as well.  He is very concerned that this could be his heart so I do think another evaluation with a cardiologist just to reassure him that there is no other further testing he needs for his heart would be helpful in his case.  I informed him that acid reflux can also present as physical chest or abdominal pain at times and perhaps a short trial of PPI and reducing acid increasing foods may help as well.  He verbalized understand this plan.  He will follow-up with cardiology and his PCP and try topical treatments for now.    Subjective:   Robert Tellez is a 43 y.o. male who presents to the office for evaluation for ongoing left-sided chest pain.  He was referred to see me by his PCP office.  He has been having ongoing pain for the last 1-1/2 years over the left side of his chest.  He denies any injury or trauma.  He has been feeling pain along the left chest and this worsens when he leans forward.  He initially went to the emergency room and had a workup including an EKG and an echo which were negative.  He has not followed up with a cardiologist.  He recently had an MRI ordered by his PCP of the chest which did not show any abnormality or mass.  He feels an intermittent sharp stabbing pain over the left chest area.  He even had a mammogram which failed to show any clear abnormality.  He states the pain slightly increases with stress.  It comes and goes.  He has  not exercised or tried to do any physical activity and feels like this makes the situation worse.  He does not recall any traumatic injury to his chest or any repetitive activity.  He is able to reproduce it by pushing on it.  He does not feel shortness of breath or diaphoretic.  He does feel pressure in his chest at times.      Review of Systems   Constitutional:  Negative for chills, fever and unexpected weight change.   HENT:  Negative for hearing loss, nosebleeds and sore throat.    Eyes:  Negative for pain, redness and visual disturbance.   Respiratory:  Negative for cough, shortness of breath and wheezing.    Cardiovascular:  Negative for chest pain, palpitations and leg swelling.   Gastrointestinal:  Negative for abdominal pain, nausea and vomiting.   Endocrine: Negative for polyphagia and polyuria.   Genitourinary:  Negative for dysuria and hematuria.   Musculoskeletal:         See HPI   Skin:  Negative for rash and wound.   Neurological:  Negative for dizziness, numbness and headaches.   Psychiatric/Behavioral:  Negative for decreased concentration and suicidal ideas. The patient is not nervous/anxious.          History reviewed. No pertinent past medical history.    History reviewed. No pertinent surgical history.    Family History   Problem Relation Age of Onset    Hyperlipidemia Mother     Cancer Mother     Coronary artery disease Father     Diabetes Father     Hyperlipidemia Father        Social History     Occupational History    Not on file   Tobacco Use    Smoking status: Never    Smokeless tobacco: Never   Vaping Use    Vaping status: Never Used   Substance and Sexual Activity    Alcohol use: Not Currently    Drug use: Never    Sexual activity: Yes     Partners: Female         Current Outpatient Medications:     ascorbic acid (VITAMIN C) 500 mg tablet, Take 500 mg by mouth daily, Disp: , Rfl:     b complex vitamins tablet, Take 1 tablet by mouth daily, Disp: , Rfl:     cholecalciferol (VITAMIN D3)  1,000 units tablet, Take 1,000 Units by mouth daily, Disp: , Rfl:     Diclofenac Sodium (VOLTAREN) 1 %, Apply 2 g topically 4 (four) times a day, Disp: 50 g, Rfl: 0    ibuprofen (MOTRIN) 600 mg tablet, Take 1 tablet (600 mg total) by mouth every 6 (six) hours as needed for mild pain, Disp: 30 tablet, Rfl: 0    clotrimazole-betamethasone (LOTRISONE) 1-0.05 % cream, Apply topically 2 (two) times a day (Patient not taking: Reported on 9/21/2022), Disp: 30 g, Rfl: 0    ibuprofen (MOTRIN) 600 mg tablet, Take 1 tablet (600 mg total) by mouth every 6 (six) hours as needed for mild pain or moderate pain (Patient not taking: Reported on 1/19/2024), Disp: 20 tablet, Rfl: 0    ketoconazole (NIZORAL) 2 % shampoo, Apply 1 application topically daily (Patient not taking: Reported on 9/21/2022), Disp: 120 mL, Rfl: 1    methocarbamol (ROBAXIN) 500 mg tablet, Take 1 tablet (500 mg total) by mouth 4 (four) times a day (Patient not taking: Reported on 1/19/2024), Disp: 20 tablet, Rfl: 0    No Known Allergies    Objective:  Vitals:    02/27/24 1013   BP: 108/73   Pulse: 65     Pain Score:   7      Ortho Exam    Physical Exam  Vitals reviewed.   Constitutional:       Appearance: He is well-developed.   HENT:      Head: Normocephalic and atraumatic.   Eyes:      Conjunctiva/sclera: Conjunctivae normal.      Pupils: Pupils are equal, round, and reactive to light.   Cardiovascular:      Rate and Rhythm: Normal rate.      Pulses: Normal pulses.   Pulmonary:      Effort: Pulmonary effort is normal. No respiratory distress.   Chest:      Chest wall: Tenderness present. No mass.          Comments: Pinpoint tenderness over left sixth rib costochondral junction.  No other palpable mass or sternal pain.  No abdominal pain on palpation.  Musculoskeletal:      Cervical back: Normal range of motion and neck supple.      Comments: As noted in HPI   Skin:     General: Skin is warm and dry.   Neurological:      General: No focal deficit present.       Mental Status: He is alert and oriented to person, place, and time.   Psychiatric:         Mood and Affect: Mood normal.         Behavior: Behavior normal.         I have personally reviewed pertinent films in PACS and my interpretation is as follows:  MRI chest wall from 2/23/2024 demonstrates no abnormality.      This document was created using speech voice recognition software.   Grammatical errors, random word insertions, pronoun errors, and incomplete sentences are an occasional consequence of this system due to software limitations, ambient noise, and hardware issues.   Any formal questions or concerns about content, text, or information contained within the body of this dictation should be directly addressed to the provider for clarification.

## 2024-02-27 NOTE — PATIENT INSTRUCTIONS
Try over the counter PPI, prilosec/nexium for 1-2 weeks to see if it makes a change  Try topical lidocaine patches  Eval with cardilogy, increase exercise as tolerated

## 2024-02-28 ENCOUNTER — TELEPHONE (OUTPATIENT)
Dept: OBGYN CLINIC | Facility: CLINIC | Age: 44
End: 2024-02-28

## 2024-02-28 ENCOUNTER — HOSPITAL ENCOUNTER (OUTPATIENT)
Dept: RADIOLOGY | Facility: HOSPITAL | Age: 44
Discharge: HOME | End: 2024-02-28
Payer: COMMERCIAL

## 2024-02-28 ENCOUNTER — OFFICE VISIT (OUTPATIENT)
Dept: PODIATRY | Facility: CLINIC | Age: 44
End: 2024-02-28
Payer: COMMERCIAL

## 2024-02-28 VITALS
HEART RATE: 68 BPM | OXYGEN SATURATION: 97 % | HEIGHT: 68 IN | BODY MASS INDEX: 28.34 KG/M2 | SYSTOLIC BLOOD PRESSURE: 117 MMHG | WEIGHT: 187 LBS | DIASTOLIC BLOOD PRESSURE: 79 MMHG

## 2024-02-28 DIAGNOSIS — M54.17 LUMBOSACRAL RADICULOPATHY: ICD-10-CM

## 2024-02-28 DIAGNOSIS — M79.672 LEFT FOOT PAIN: ICD-10-CM

## 2024-02-28 DIAGNOSIS — G57.92 NEURITIS OF LEFT FOOT: ICD-10-CM

## 2024-02-28 DIAGNOSIS — M54.17 LUMBOSACRAL RADICULOPATHY: Primary | ICD-10-CM

## 2024-02-28 DIAGNOSIS — M77.42 METATARSALGIA, LEFT FOOT: ICD-10-CM

## 2024-02-28 PROCEDURE — 72110 X-RAY EXAM L-2 SPINE 4/>VWS: CPT

## 2024-02-28 PROCEDURE — 99213 OFFICE O/P EST LOW 20 MIN: CPT | Performed by: PODIATRIST

## 2024-02-28 RX ORDER — MELOXICAM 7.5 MG/1
7.5 TABLET ORAL DAILY
Qty: 30 TABLET | Refills: 1 | Status: SHIPPED | OUTPATIENT
Start: 2024-02-28

## 2024-02-28 NOTE — PROGRESS NOTES
Assessment/Plan:     The patient's clinical examination today significant for mild to moderate tenderness palpation along the dorsal aspect of the left midfoot.  Palpable spurring is noted at the met cuneiform joints.  There is persistent mild tenderness noted with palpation along the mid shaft of the left second metatarsal.  There is no erythema nor edema no calor or ecchymosis noted to the left foot.     The patient's MRI films were personally reviewed and interpreted.  I saw no is no marrow edema noted to the midfoot.  Soft tissue structures appear to be within normal limits.  The official report was appreciated and there were no acute findings.    At this point in time the patient's symptomatology is concerning for some neuritic origin of his pain.  The skin potentially be secondary to radiculopathy/sciatica as the patient does note increasing low back pain which occasionally radiates down his leg.  Secondarily, we can consider potential nerve impingement on the dorsum of his foot from the midfoot osteophytes.    The patient was referred for x-rays of his lumbosacral spine.  An EMG/NCV study was also ordered to rule out neuritic origin of his left foot pain.  In the interim, I will start him on a course of meloxicam 7.5 mg daily for its anti-inflammatory effect.    Recommend follow-up after his EMG/NCV studies been completed to review results.     Diagnoses and all orders for this visit:    Lumbosacral radiculopathy  -     XR spine lumbar minimum 4 views non injury; Future    Left foot pain  -     EMG 2 limb lower extremity; Future  -     meloxicam (Mobic) 7.5 mg tablet; Take 1 tablet (7.5 mg total) by mouth daily  -     XR spine lumbar minimum 4 views non injury; Future    Metatarsalgia, left foot  -     EMG 2 limb lower extremity; Future  -     meloxicam (Mobic) 7.5 mg tablet; Take 1 tablet (7.5 mg total) by mouth daily  -     XR spine lumbar minimum 4 views non injury; Future    Neuritis of left foot           Subjective:     Patient ID: Robert Tellez is a 43 y.o. male.    The patient presents today for follow-up of left midfoot pain that has been present now for several months.  The patient notes no significant improvement since his last visit.  He notes persistent numbness and tingling sensations which started in the middle of his foot and radiate distally to the forefoot.  Most of his tenderness seems to be isolated to the central rays.      PAST MEDICAL HISTORY:  History reviewed. No pertinent past medical history.    PAST SURGICAL HISTORY:  History reviewed. No pertinent surgical history.     ALLERGIES:  Patient has no known allergies.    MEDICATIONS:  Current Outpatient Medications   Medication Sig Dispense Refill    ascorbic acid (VITAMIN C) 500 mg tablet Take 500 mg by mouth daily      b complex vitamins tablet Take 1 tablet by mouth daily      cholecalciferol (VITAMIN D3) 1,000 units tablet Take 1,000 Units by mouth daily      Diclofenac Sodium (VOLTAREN) 1 % Apply 2 g topically 4 (four) times a day 50 g 0    ibuprofen (MOTRIN) 600 mg tablet Take 1 tablet (600 mg total) by mouth every 6 (six) hours as needed for mild pain 30 tablet 0    meloxicam (Mobic) 7.5 mg tablet Take 1 tablet (7.5 mg total) by mouth daily 30 tablet 1    clotrimazole-betamethasone (LOTRISONE) 1-0.05 % cream Apply topically 2 (two) times a day (Patient not taking: Reported on 9/21/2022) 30 g 0    ibuprofen (MOTRIN) 600 mg tablet Take 1 tablet (600 mg total) by mouth every 6 (six) hours as needed for mild pain or moderate pain (Patient not taking: Reported on 1/19/2024) 20 tablet 0    ketoconazole (NIZORAL) 2 % shampoo Apply 1 application topically daily (Patient not taking: Reported on 9/21/2022) 120 mL 1    methocarbamol (ROBAXIN) 500 mg tablet Take 1 tablet (500 mg total) by mouth 4 (four) times a day (Patient not taking: Reported on 1/19/2024) 20 tablet 0     No current facility-administered medications for this visit.       SOCIAL  HISTORY:  Social History     Socioeconomic History    Marital status: /Civil Union     Spouse name: None    Number of children: None    Years of education: None    Highest education level: None   Occupational History    None   Tobacco Use    Smoking status: Never    Smokeless tobacco: Never   Vaping Use    Vaping status: Never Used   Substance and Sexual Activity    Alcohol use: Not Currently    Drug use: Never    Sexual activity: Yes     Partners: Female   Other Topics Concern    None   Social History Narrative    None     Social Determinants of Health     Financial Resource Strain: Not on file   Food Insecurity: Not on file   Transportation Needs: Not on file   Physical Activity: Not on file   Stress: Not on file   Social Connections: Not on file   Intimate Partner Violence: Not on file   Housing Stability: Not on file        Review of Systems   Constitutional: Negative.    HENT: Negative.     Eyes: Negative.    Respiratory: Negative.     Cardiovascular: Negative.    Endocrine: Negative.    Musculoskeletal: Negative.    Neurological: Negative.    Hematological: Negative.    Psychiatric/Behavioral: Negative.           Objective:     Physical Exam  Vitals reviewed.   Constitutional:       Appearance: Normal appearance.   HENT:      Head: Normocephalic and atraumatic.      Nose: Nose normal.   Eyes:      Conjunctiva/sclera: Conjunctivae normal.      Pupils: Pupils are equal, round, and reactive to light.   Cardiovascular:      Pulses:           Dorsalis pedis pulses are 2+ on the right side and 2+ on the left side.        Posterior tibial pulses are 2+ on the right side and 2+ on the left side.   Pulmonary:      Effort: Pulmonary effort is normal.   Feet:      Comments: The patient's clinical examination today significant for mild to moderate tenderness palpation along the dorsal aspect of the left midfoot.  Palpable spurring is noted at the met cuneiform joints.  There is persistent mild tenderness noted with  palpation along the mid shaft of the left second metatarsal.  There is no erythema nor edema no calor or ecchymosis noted to the left foot.     Skin:     General: Skin is warm.      Capillary Refill: Capillary refill takes less than 2 seconds.   Neurological:      General: No focal deficit present.      Mental Status: He is alert and oriented to person, place, and time.   Psychiatric:         Mood and Affect: Mood normal.         Behavior: Behavior normal.         Thought Content: Thought content normal.

## 2024-02-29 ENCOUNTER — TELEPHONE (OUTPATIENT)
Dept: FAMILY MEDICINE CLINIC | Facility: CLINIC | Age: 44
End: 2024-02-29

## 2024-02-29 NOTE — TELEPHONE ENCOUNTER
----- Message from Elvira Singh MD sent at 2/28/2024  4:53 PM EST -----  Please call patient with normal results.   
Patient saw results (normal) on mychart  
no

## 2024-04-03 ENCOUNTER — OFFICE VISIT (OUTPATIENT)
Dept: FAMILY MEDICINE CLINIC | Facility: CLINIC | Age: 44
End: 2024-04-03
Payer: COMMERCIAL

## 2024-04-03 VITALS
RESPIRATION RATE: 16 BRPM | OXYGEN SATURATION: 99 % | HEIGHT: 68 IN | WEIGHT: 178 LBS | BODY MASS INDEX: 26.98 KG/M2 | DIASTOLIC BLOOD PRESSURE: 70 MMHG | HEART RATE: 64 BPM | SYSTOLIC BLOOD PRESSURE: 120 MMHG

## 2024-04-03 DIAGNOSIS — M54.17 LUMBOSACRAL RADICULOPATHY: ICD-10-CM

## 2024-04-03 DIAGNOSIS — M94.0 COSTAL CHONDRITIS: Primary | ICD-10-CM

## 2024-04-03 PROCEDURE — 99214 OFFICE O/P EST MOD 30 MIN: CPT | Performed by: FAMILY MEDICINE

## 2024-04-03 RX ORDER — METHOCARBAMOL 500 MG/1
500 TABLET, FILM COATED ORAL
Qty: 90 TABLET | Refills: 0 | Status: SHIPPED | OUTPATIENT
Start: 2024-04-03

## 2024-04-03 RX ORDER — MELOXICAM 15 MG/1
15 TABLET ORAL DAILY
Qty: 90 TABLET | Refills: 0 | Status: SHIPPED | OUTPATIENT
Start: 2024-04-03

## 2024-04-03 NOTE — PROGRESS NOTES
Name: Robert Tellez      : 1980      MRN: [id number]  Encounter Provider: Thomas Anderson MD  Encounter Date: 4/3/2024   Encounter department: [facility]    Assessment & Plan   1. Costal chondritis  -     meloxicam (Mobic) 15 mg tablet; Take 1 tablet (15 mg total) by mouth daily  -     methocarbamol (ROBAXIN) 500 mg tablet; Take 1 tablet (500 mg total) by mouth daily at bedtime  -     Ambulatory Referral to Chiropractic; Future    2. Lumbosacral radiculopathy  -     Ambulatory Referral to Chiropractic; Future        Assessment & Plan  1. Costochondritis.  The patient's chest x-ray, lungs, muscles, and cardiomediastinum, suggest the possibility of costochondritis. The patient has been advised to apply Voltaren cream thrice daily for a duration of one month. Additionally, he has been advised to maintain a conscious chest position during driving. A prescription for Mobic 15 mg daily for a duration of three months, along with a muscle relaxer to be taken at bedtime, has been provided.    2. Potential peroneal neuropathy.  The patient's lower back pain could potentially be attributed to sciatica. An EMG test has been ordered for the patient. A referral to a chiropractor has been made to address his lower back pain. A memory foam cushion and lumbar support for his car has been recommended. Should the chiropractic treatment prove effective and the EMG results indicate peroneal neuropathy, physical therapy will be considered.     No orders of the defined types were placed in this encounter.      Subjective      History of Present Illness  The patient is a 42-year-old male who presents for evaluation of multiple medical concerns.    The patient has been experiencing intermittent left-sided chest pain for an extended period, which prompted him to seek emergency care in 2024. An EKG and other diagnostic tests conducted at that time yielded normal results. The patient has a history of sports-related  injuries, including frequent basketball play. Despite his efforts to identify a pattern, the pain persists. He consulted with Dr. Singh in 01/2024, who conducted diagnostic tests, all of which returned normal results. A mammogram was also performed. The patient's pain varies in intensity, ranging from intense to mild, and at other times, it is absent. The pain intensifies when he is driving or hunched over. Recently, he has begun to experience pain on the other side of his chest, which has negatively impacted his ability to exercise. He has abstained from sports activities and is uncertain if the pain is related to his ribs or cartilage. He has an upcoming appointment with a cardiologist.    Several years ago, the patient sustained a twisting injury to his left foot, which resulted in a purplish discoloration but no fracture. Since his international travel, his foot has not fully healed, characterized by a burning and tingling sensation. The pain is severe enough to disrupt his sleep. He has consulted with a podiatrist, Dr. Bahena, who initially suspected arthritis. An MRI was performed, yielding normal results, and prescribed meloxicam. Arthritis was ruled out post-MRI. Initially, Dr. Bahena considered administering a steroid injection in his foot, but Dr. Bahena deferred this decision after reviewing the MRI. The patient also reports severe, intermittent back pain, which he believes may be related to his posture. He is scheduled for a neurological test at the end of this month. The patient reports feelings of depression, decreased interest in activities, and sleep disturbances.  Review of Systems   Constitutional:  Negative for activity change, appetite change and fever.   HENT:  Negative for congestion, nosebleeds and trouble swallowing.    Eyes:  Negative for itching.   Respiratory:  Negative for cough and chest tightness.    Cardiovascular:  Positive for chest pain. Negative for palpitations.   Gastrointestinal:   "Negative for abdominal pain, constipation, diarrhea and nausea.   Endocrine: Negative for cold intolerance.   Genitourinary:  Negative for frequency.   Musculoskeletal:  Negative for gait problem and joint swelling.   Skin:  Negative for rash.   Allergic/Immunologic: Negative for immunocompromised state.   Neurological:  Negative for dizziness, tremors, seizures, syncope and headaches.   Psychiatric/Behavioral:  Negative for hallucinations and suicidal ideas.          Objective     /70   Pulse 64   Resp 16   Ht 5' 8\" (1.727 m)   Wt 80.7 kg (178 lb)   SpO2 99%   BMI 27.06 kg/m²     Physical Exam    Physical Exam  Vitals and nursing note reviewed.   Constitutional:       General: He is not in acute distress.     Appearance: He is well-developed.   HENT:      Head: Normocephalic and atraumatic.   Cardiovascular:      Rate and Rhythm: Normal rate and regular rhythm.      Heart sounds: Normal heart sounds. No murmur heard.  Pulmonary:      Effort: Pulmonary effort is normal.      Breath sounds: Normal breath sounds. No wheezing or rales.   Abdominal:      General: Bowel sounds are normal. There is no distension.      Palpations: Abdomen is soft.      Tenderness: There is no abdominal tenderness. There is no guarding or rebound.   Musculoskeletal:         General: No tenderness (left sided chest wall tenderness to palpation). Normal range of motion.      Cervical back: Normal range of motion and neck supple.   Lymphadenopathy:      Cervical: No cervical adenopathy.   Skin:     General: Skin is warm and dry.      Capillary Refill: Capillary refill takes less than 2 seconds.      Findings: No rash.   Neurological:      Mental Status: He is alert and oriented to person, place, and time.      Cranial Nerves: No cranial nerve deficit.      Sensory: No sensory deficit.      Motor: No abnormal muscle tone.   Psychiatric:         Behavior: Behavior normal.         Thought Content: Thought content normal.         " Judgment: Judgment normal.

## 2024-04-05 ENCOUNTER — CONSULT (OUTPATIENT)
Dept: CARDIOLOGY CLINIC | Facility: CLINIC | Age: 44
End: 2024-04-05
Payer: COMMERCIAL

## 2024-04-05 VITALS
OXYGEN SATURATION: 97 % | BODY MASS INDEX: 27.34 KG/M2 | HEIGHT: 68 IN | DIASTOLIC BLOOD PRESSURE: 72 MMHG | HEART RATE: 65 BPM | WEIGHT: 180.4 LBS | SYSTOLIC BLOOD PRESSURE: 112 MMHG

## 2024-04-05 DIAGNOSIS — E78.2 MIXED HYPERLIPIDEMIA: ICD-10-CM

## 2024-04-05 DIAGNOSIS — R07.89 COSTOCHONDRAL CHEST PAIN: ICD-10-CM

## 2024-04-05 DIAGNOSIS — R07.2 PRECORDIAL PAIN: Primary | ICD-10-CM

## 2024-04-05 DIAGNOSIS — R07.89 OTHER CHEST PAIN: ICD-10-CM

## 2024-04-05 PROCEDURE — 99204 OFFICE O/P NEW MOD 45 MIN: CPT | Performed by: INTERNAL MEDICINE

## 2024-04-05 RX ORDER — METOPROLOL TARTRATE 50 MG/1
50 TABLET, FILM COATED ORAL ONCE
Qty: 1 TABLET | Refills: 0 | Status: SHIPPED | OUTPATIENT
Start: 2024-04-05 | End: 2024-04-05

## 2024-04-05 RX ORDER — TURMERIC 100 %
POWDER (GRAM) MISCELLANEOUS DAILY
COMMUNITY

## 2024-04-05 NOTE — PROGRESS NOTES
Cardiology consult    Robert Tellez  1980  [id number]  [facility]  [address]  [address]  [place] [address] [phone]    1. Precordial pain  Ambulatory Referral to Cardiology    CTA cardiac    VAS CAMRYN & waveform analysis, multiple levels    metoprolol tartrate (LOPRESSOR) 50 mg tablet      2. Costochondral chest pain  Ambulatory Referral to Cardiology    Ambulatory Referral to Cardiology    CTA cardiac    VAS CAMRYN & waveform analysis, multiple levels    metoprolol tartrate (LOPRESSOR) 50 mg tablet      3. Mixed hyperlipidemia  CTA cardiac    VAS CAMRYN & waveform analysis, multiple levels      4. Other chest pain  CTA cardiac    VAS CAMRYN & waveform analysis, multiple levels          Discussion/Summary: Patient has had ongoing chest pain.  He has had prior stress testing that was negative.  We need to definitively rule out any anomalous coronary artery or obstructive coronary disease recommend cardiac CTA.  Will give 1 dose of metoprolol 50 mg of tartrate 2 hours before the test.  Blood pressure is well-controlled.  Lipids are mildly elevated we talked about lifestyle modifications.  He has some pain in the left leg which sounds mostly like neuropathy we will check an CAMRYN to make sure there is no vascular insufficiency.    Interval History: 43-year-old gentleman presents as a new patient consult on behalf of Dr. Anderson for chest pain.  He had a diagnosis of costochondritis involving the left chest wall he is tried multiple medications without success.  He has 2 different symptoms what appears to be reproducible pain in the chest wall that is positional in nature.  The other is a sense of pressure or heaviness underneath this area that can occur with and without exertion.  Denies any palpitations.  There is been no lightheadedness or dizziness.  Denies any syncope.  He does have pain in the left leg that occurs with exertion  and also at rest.  He has some numbness in the left foot.    Medical Problems       Problem List       Mixed hyperlipidemia    Costochondral chest pain    Mass of chest wall, left    Precordial pain    Other chest pain        History reviewed. No pertinent past medical history.  Social History     Socioeconomic History    Marital status: /Civil Union     Spouse name: Not on file    Number of children: Not on file    Years of education: Not on file    Highest education level: Not on file   Occupational History    Not on file   Tobacco Use    Smoking status: Never    Smokeless tobacco: Never   Vaping Use    Vaping status: Never Used   Substance and Sexual Activity    Alcohol use: Not Currently    Drug use: Never    Sexual activity: Yes     Partners: Female   Other Topics Concern    Not on file   Social History Narrative    Not on file     Social Determinants of Health     Financial Resource Strain: Not on file   Food Insecurity: Not on file   Transportation Needs: Not on file   Physical Activity: Not on file   Stress: Not on file   Social Connections: Not on file   Intimate Partner Violence: Not on file   Housing Stability: Not on file      Family History   Problem Relation Age of Onset    Hyperlipidemia Mother     Cancer Mother     Coronary artery disease Father     Diabetes Father     Hyperlipidemia Father      History reviewed. No pertinent surgical history.    Current Outpatient Medications:     ascorbic acid (VITAMIN C) 500 mg tablet, Take 500 mg by mouth daily, Disp: , Rfl:     b complex vitamins tablet, Take 1 tablet by mouth daily, Disp: , Rfl:     cholecalciferol (VITAMIN D3) 1,000 units tablet, Take 1,000 Units by mouth daily, Disp: , Rfl:     Diclofenac Sodium (VOLTAREN) 1 %, Apply 2 g topically 4 (four) times a day, Disp: 50 g, Rfl: 0    meloxicam (Mobic) 15 mg tablet, Take 1 tablet (15 mg total) by mouth daily, Disp: 90 tablet, Rfl: 0    methocarbamol (ROBAXIN) 500 mg tablet, Take 1 tablet (500 mg  "total) by mouth daily at bedtime, Disp: 90 tablet, Rfl: 0    metoprolol tartrate (LOPRESSOR) 50 mg tablet, Take 1 tablet (50 mg total) by mouth once for 1 dose Please take 2 hour before cardiac CTA, Disp: 1 tablet, Rfl: 0    Specialty Vitamins Products (BIOTIN PLUS KERATIN PO), Take by mouth in the morning, Disp: , Rfl:     Turmeric POWD, Use in the morning, Disp: , Rfl:     clotrimazole-betamethasone (LOTRISONE) 1-0.05 % cream, Apply topically 2 (two) times a day (Patient not taking: Reported on 9/21/2022), Disp: 30 g, Rfl: 0    ketoconazole (NIZORAL) 2 % shampoo, Apply 1 application topically daily (Patient not taking: Reported on 9/21/2022), Disp: 120 mL, Rfl: 1  No Known Allergies    Labs:     Chemistry        Component Value Date/Time    K 4.4 01/19/2024 1040     01/19/2024 1040    CO2 32 01/19/2024 1040    BUN 11 01/19/2024 1040    CREATININE 0.89 01/19/2024 1040        Component Value Date/Time    CALCIUM 10.0 01/19/2024 1040    ALKPHOS 68 01/19/2024 1040    AST 21 01/19/2024 1040    ALT 29 01/19/2024 1040            No results found for: \"CHOL\"  Lab Results   Component Value Date    HDL 69 01/19/2024    HDL 72 11/12/2020     Lab Results   Component Value Date    LDLCALC 137 (H) 01/19/2024    LDLCALC 133 (H) 11/12/2020     Lab Results   Component Value Date    TRIG 70 01/19/2024    TRIG 88 11/12/2020     No results found for: \"CHOLHDL\"    Imaging: No results found.    ECG:        ROS    Vitals:    04/05/24 1426   BP: 112/72   Pulse: 65   SpO2: 97%     Vitals:    04/05/24 1426   Weight: 81.8 kg (180 lb 6.4 oz)     Height: 5' 8\" (172.7 cm)   Body mass index is 27.43 kg/m².    Physical Exam:  Vital signs reviewed  General:  Alert and cooperative, appears stated age, no acute distress  HEENT:  PERRLA, EOMI, no scleral icterus, no conjunctival pallor  Neck:  No lymphadenopathy, no thyromegaly, no carotid bruits, no elevated JVP  Heart:  Regular rate and rhythm, normal S1/S2, no S3/S4, no murmur, rubs or " gallops.  PMI nondisplaced  Lungs:  Clear to auscultation bilaterally, no wheezes rales or rhonchi  Abdomen:  Soft, non-tender, positive bowel sounds, no rebound or guarding,   no organomegaly   Extremities:  Normal range of motion.  No clubbing, cyanosis or edema   Vascular:  2+ pedal pulses  Skin:  No rashes or lesions on exposed skin  Neurologic:  Cranial nerves II-XII grossly intact without focal deficits

## 2024-04-09 ENCOUNTER — TELEPHONE (OUTPATIENT)
Age: 44
End: 2024-04-09

## 2024-04-09 ENCOUNTER — HOSPITAL ENCOUNTER (OUTPATIENT)
Dept: NEUROLOGY | Facility: CLINIC | Age: 44
Discharge: HOME | End: 2024-04-09
Payer: COMMERCIAL

## 2024-04-09 DIAGNOSIS — M79.672 LEFT FOOT PAIN: ICD-10-CM

## 2024-04-09 DIAGNOSIS — M77.42 METATARSALGIA, LEFT FOOT: ICD-10-CM

## 2024-04-09 PROBLEM — M54.16 RADICULOPATHY, LUMBAR REGION: Status: ACTIVE | Noted: 2024-04-09

## 2024-04-09 PROCEDURE — 95886 MUSC TEST DONE W/N TEST COMP: CPT | Performed by: PSYCHIATRY & NEUROLOGY

## 2024-04-09 PROCEDURE — 95911 NRV CNDJ TEST 9-10 STUDIES: CPT | Performed by: PSYCHIATRY & NEUROLOGY

## 2024-04-09 NOTE — TELEPHONE ENCOUNTER
Caller: Robert      Doctor and/or Office: Brennan/jessee SORIA#: [phone]    Escalation: Appointment Patient called he had his EMG today and stated that Dr Amin wanted to see him after his test to review the results.  Please advise thank you.

## 2024-04-10 NOTE — TELEPHONE ENCOUNTER
Patient called back said would Take he would take Monday April 22nd At 10:30 Am. Please advise thank you

## 2024-04-19 ENCOUNTER — CONSULT (OUTPATIENT)
Age: 44
End: 2024-04-19
Payer: COMMERCIAL

## 2024-04-19 VITALS
BODY MASS INDEX: 27.28 KG/M2 | HEIGHT: 68 IN | DIASTOLIC BLOOD PRESSURE: 84 MMHG | SYSTOLIC BLOOD PRESSURE: 119 MMHG | HEART RATE: 84 BPM | WEIGHT: 180 LBS

## 2024-04-19 DIAGNOSIS — G89.29 CHRONIC BILATERAL LOW BACK PAIN WITH LEFT-SIDED SCIATICA: Primary | ICD-10-CM

## 2024-04-19 DIAGNOSIS — M54.6 CHRONIC LEFT-SIDED THORACIC BACK PAIN: ICD-10-CM

## 2024-04-19 DIAGNOSIS — M94.0 COSTAL CHONDRITIS: ICD-10-CM

## 2024-04-19 DIAGNOSIS — M54.42 CHRONIC BILATERAL LOW BACK PAIN WITH LEFT-SIDED SCIATICA: Primary | ICD-10-CM

## 2024-04-19 DIAGNOSIS — G89.29 CHRONIC LEFT-SIDED THORACIC BACK PAIN: ICD-10-CM

## 2024-04-19 DIAGNOSIS — M54.17 LUMBOSACRAL RADICULOPATHY: ICD-10-CM

## 2024-04-19 PROCEDURE — 99203 OFFICE O/P NEW LOW 30 MIN: CPT | Performed by: CHIROPRACTOR

## 2024-04-19 PROCEDURE — 98941 CHIROPRACT MANJ 3-4 REGIONS: CPT | Performed by: CHIROPRACTOR

## 2024-04-22 ENCOUNTER — OFFICE VISIT (OUTPATIENT)
Dept: PODIATRY | Facility: CLINIC | Age: 44
End: 2024-04-22
Payer: COMMERCIAL

## 2024-04-22 VITALS
HEART RATE: 69 BPM | BODY MASS INDEX: 27.28 KG/M2 | DIASTOLIC BLOOD PRESSURE: 87 MMHG | WEIGHT: 180 LBS | OXYGEN SATURATION: 98 % | HEIGHT: 68 IN | SYSTOLIC BLOOD PRESSURE: 128 MMHG

## 2024-04-22 DIAGNOSIS — G57.92 NEURITIS OF LEFT FOOT: ICD-10-CM

## 2024-04-22 DIAGNOSIS — M54.17 LUMBOSACRAL RADICULOPATHY: Primary | ICD-10-CM

## 2024-04-22 PROCEDURE — 99213 OFFICE O/P EST LOW 20 MIN: CPT | Performed by: PODIATRIST

## 2024-04-22 RX ORDER — MELOXICAM 7.5 MG/1
7.5 TABLET ORAL DAILY
Qty: 30 TABLET | Refills: 2 | Status: SHIPPED | OUTPATIENT
Start: 2024-04-22 | End: 2024-04-22

## 2024-04-22 RX ORDER — MELOXICAM 7.5 MG/1
7.5 TABLET ORAL DAILY
Qty: 90 TABLET | Refills: 1 | Status: SHIPPED | OUTPATIENT
Start: 2024-04-22 | End: 2024-10-19

## 2024-04-22 NOTE — PROGRESS NOTES
Assessment/Plan:     The patient's clinical examination today is essentially unchanged.  There is persistent diffuse tenderness across the dorsum of the left midfoot.  Paresthesias are also noted at the level of the left midfoot and forefoot.  Pedal pulses are palpable.      X-ray report of the lumbosacral spine with mild degenerative changes noted to the lumbar spine.  The EMG/NCV study report was also reviewed and appreciated.  Findings were consistent with subacute/chronic L5-S1 radiculopathy ongoing denervation.    EMG study report was reviewed with the patient in detail.  Recommended referral to pain and spine.  Physical therapy consultation was also placed for lumbosacral radiculopathy.  A prescription was also sent today for meloxicam 7.5 mg daily to be taken on as needed basis for left lower extremity pain.    The patient can follow-up with me on an as-needed basis, as his symptomatology is more likely attributed to lumbosacral radiculopathy.       Diagnoses and all orders for this visit:    Lumbosacral radiculopathy  -     Ambulatory referral to Spine & Pain Management; Future  -     Ambulatory referral to Physical Therapy; Future  -     Discontinue: meloxicam (Mobic) 7.5 mg tablet; Take 1 tablet (7.5 mg total) by mouth daily  -     meloxicam (Mobic) 7.5 mg tablet; Take 1 tablet (7.5 mg total) by mouth daily    Neuritis of left foot          Subjective:     Patient ID: Robert Tellez is a 43 y.o. male.    The patient presents today for follow-up of a EMG/NCV study of his lateral lower extremities.  The patient notes that his symptomatology is essentially unchanged.  He still notes diffuse discomfort to the left lower extremity.  He also notes stiffness in his lower back when sitting for prolonged periods of time.      PAST MEDICAL HISTORY:  History reviewed. No pertinent past medical history.    PAST SURGICAL HISTORY:  History reviewed. No pertinent surgical history.     ALLERGIES:  Patient has no known  allergies.    MEDICATIONS:  Current Outpatient Medications   Medication Sig Dispense Refill    ascorbic acid (VITAMIN C) 500 mg tablet Take 500 mg by mouth daily      b complex vitamins tablet Take 1 tablet by mouth daily      cholecalciferol (VITAMIN D3) 1,000 units tablet Take 1,000 Units by mouth daily      Diclofenac Sodium (VOLTAREN) 1 % Apply 2 g topically 4 (four) times a day 50 g 0    meloxicam (Mobic) 7.5 mg tablet Take 1 tablet (7.5 mg total) by mouth daily 90 tablet 1    methocarbamol (ROBAXIN) 500 mg tablet Take 1 tablet (500 mg total) by mouth daily at bedtime 90 tablet 0    Specialty Vitamins Products (BIOTIN PLUS KERATIN PO) Take by mouth in the morning      Turmeric POWD Use in the morning      clotrimazole-betamethasone (LOTRISONE) 1-0.05 % cream Apply topically 2 (two) times a day (Patient not taking: Reported on 9/21/2022) 30 g 0    ketoconazole (NIZORAL) 2 % shampoo Apply 1 application topically daily (Patient not taking: Reported on 9/21/2022) 120 mL 1    metoprolol tartrate (LOPRESSOR) 50 mg tablet Take 1 tablet (50 mg total) by mouth once for 1 dose Please take 2 hour before cardiac CTA 1 tablet 0     No current facility-administered medications for this visit.       SOCIAL HISTORY:  Social History     Socioeconomic History    Marital status: /Civil Union     Spouse name: None    Number of children: None    Years of education: None    Highest education level: None   Occupational History    None   Tobacco Use    Smoking status: Never    Smokeless tobacco: Never   Vaping Use    Vaping status: Never Used   Substance and Sexual Activity    Alcohol use: Not Currently    Drug use: Never    Sexual activity: Yes     Partners: Female   Other Topics Concern    None   Social History Narrative    None     Social Determinants of Health     Financial Resource Strain: Not on file   Food Insecurity: Not on file   Transportation Needs: Not on file   Physical Activity: Not on file   Stress: Not on file    Social Connections: Not on file   Intimate Partner Violence: Not on file   Housing Stability: Not on file        Review of Systems   Constitutional: Negative.    HENT: Negative.     Eyes: Negative.    Respiratory: Negative.     Cardiovascular: Negative.    Endocrine: Negative.    Musculoskeletal: Negative.    Neurological: Negative.    Hematological: Negative.    Psychiatric/Behavioral: Negative.           Objective:     Physical Exam  Vitals reviewed.   Constitutional:       Appearance: Normal appearance.   HENT:      Head: Normocephalic and atraumatic.      Nose: Nose normal.   Eyes:      Conjunctiva/sclera: Conjunctivae normal.      Pupils: Pupils are equal, round, and reactive to light.   Cardiovascular:      Pulses:           Dorsalis pedis pulses are 2+ on the left side.        Posterior tibial pulses are 2+ on the left side.   Pulmonary:      Effort: Pulmonary effort is normal.   Feet:      Left foot:      Skin integrity: Skin integrity normal.      Comments: The patient's clinical examination today is essentially unchanged.  There is persistent diffuse tenderness across the dorsum of the left midfoot.  Paresthesias are also noted at the level of the left midfoot and forefoot.  Pedal pulses are palpable.    Skin:     General: Skin is warm.      Capillary Refill: Capillary refill takes less than 2 seconds.   Neurological:      General: No focal deficit present.      Mental Status: He is alert and oriented to person, place, and time.   Psychiatric:         Mood and Affect: Mood normal.         Behavior: Behavior normal.         Thought Content: Thought content normal.

## 2024-04-26 ENCOUNTER — TELEPHONE (OUTPATIENT)
Age: 44
End: 2024-04-26

## 2024-04-26 DIAGNOSIS — R07.89 OTHER CHEST PAIN: Primary | ICD-10-CM

## 2024-04-26 NOTE — TELEPHONE ENCOUNTER
Ramirez, from [facility] called & stated that pt will be going for a CTA Scan on 5/17 & needs Dr. Francisco to please order CMP for pt.     Edite can be reached at [phone]

## 2024-05-01 ENCOUNTER — PROCEDURE VISIT (OUTPATIENT)
Age: 44
End: 2024-05-01
Payer: COMMERCIAL

## 2024-05-01 ENCOUNTER — HOSPITAL ENCOUNTER (OUTPATIENT)
Dept: VASCULAR ULTRASOUND | Facility: HOSPITAL | Age: 44
Discharge: HOME | End: 2024-05-01
Attending: INTERNAL MEDICINE
Payer: COMMERCIAL

## 2024-05-01 VITALS
SYSTOLIC BLOOD PRESSURE: 114 MMHG | DIASTOLIC BLOOD PRESSURE: 73 MMHG | HEART RATE: 74 BPM | WEIGHT: 180 LBS | BODY MASS INDEX: 27.28 KG/M2 | HEIGHT: 68 IN

## 2024-05-01 DIAGNOSIS — G89.29 CHRONIC BILATERAL LOW BACK PAIN WITH LEFT-SIDED SCIATICA: Primary | ICD-10-CM

## 2024-05-01 DIAGNOSIS — M54.42 CHRONIC BILATERAL LOW BACK PAIN WITH LEFT-SIDED SCIATICA: Primary | ICD-10-CM

## 2024-05-01 DIAGNOSIS — M54.6 CHRONIC LEFT-SIDED THORACIC BACK PAIN: ICD-10-CM

## 2024-05-01 DIAGNOSIS — M54.17 LUMBOSACRAL RADICULOPATHY: ICD-10-CM

## 2024-05-01 DIAGNOSIS — R07.89 OTHER CHEST PAIN: ICD-10-CM

## 2024-05-01 DIAGNOSIS — G89.29 CHRONIC LEFT-SIDED THORACIC BACK PAIN: ICD-10-CM

## 2024-05-01 DIAGNOSIS — E78.2 MIXED HYPERLIPIDEMIA: ICD-10-CM

## 2024-05-01 DIAGNOSIS — M94.0 COSTAL CHONDRITIS: ICD-10-CM

## 2024-05-01 DIAGNOSIS — R07.89 COSTOCHONDRAL CHEST PAIN: ICD-10-CM

## 2024-05-01 DIAGNOSIS — R07.2 PRECORDIAL PAIN: ICD-10-CM

## 2024-05-01 PROCEDURE — 93923 UPR/LXTR ART STDY 3+ LVLS: CPT

## 2024-05-01 PROCEDURE — 93923 UPR/LXTR ART STDY 3+ LVLS: CPT | Performed by: SURGERY

## 2024-05-01 PROCEDURE — 97110 THERAPEUTIC EXERCISES: CPT | Performed by: CHIROPRACTOR

## 2024-05-01 PROCEDURE — 98941 CHIROPRACT MANJ 3-4 REGIONS: CPT | Performed by: CHIROPRACTOR

## 2024-05-01 NOTE — PROGRESS NOTES
HPI:  Back Pain  This is a chronic problem. The current episode started more than 1 year ago. The problem occurs intermittently. The problem has been waxing and waning since onset. The pain is present in the lumbar spine and sacro-iliac. The quality of the pain is described as aching and shooting. The pain radiates to the left foot, left thigh and left knee. The pain is at a severity of 8/10. The pain is moderate. The symptoms are aggravated by standing, twisting and position. Associated symptoms include paresthesias. He has tried heat, NSAIDs and home exercises for the symptoms. The treatment provided mild relief.     Robert Tellez is a 43 y.o. male who presents for evaluation and possible treatment at the request of his PCP Dr. Anderson.  He describes a chronic history of low back and left leg pain with particularly symptomatic left foot numbness and tingling.  He describes also a second complaint of left anterior chest and rib cage discomfort.    Symptoms have been intermittent over the past number of years.  He has had occasional flareups of back and leg pain which she is treated conservatively.  He did describe a left foot inversion sprain a while back which persisted symptomatically.  He has had extensive evaluation cardiac wise because of his anterior chest pain.    Symptoms are aggravated by bending twisting and turning and because of the persistence or resulting in functional loss.  He finds that playing sports with his children is becoming more difficult.  This is happening affect upon him as he is unable to do the things that he wants to.  He also reports nonrestorative sleep secondary to his symptoms.    Today he denies any changes in bowel bladder recent fever chills night sweats or infection.  He reports no pain upon recumbency.      Chief Complaint   Patient presents with   • Chest - Pain     Left chest pain.  Pain score 6       • Left Foot - Pain     Left foot pain score 8    Patient states  intermittent tingling, pins and needles and dull pain    • Back Pain     Lower lumbar pain that radiates down left leg. Pain score   7     History reviewed. No pertinent past medical history.   History reviewed. No pertinent surgical history.  Family History   Problem Relation Age of Onset   • Hyperlipidemia Mother    • Cancer Mother    • Coronary artery disease Father    • Diabetes Father    • Hyperlipidemia Father      Social History     Socioeconomic History   • Marital status: /Civil Union     Spouse name: None   • Number of children: None   • Years of education: None   • Highest education level: None   Occupational History   • None   Tobacco Use   • Smoking status: Never   • Smokeless tobacco: Never   Vaping Use   • Vaping status: Never Used   Substance and Sexual Activity   • Alcohol use: Not Currently   • Drug use: Never   • Sexual activity: Yes     Partners: Female   Other Topics Concern   • None   Social History Narrative   • None     Social Determinants of Health     Financial Resource Strain: Not on file   Food Insecurity: Not on file   Transportation Needs: Not on file   Physical Activity: Not on file   Stress: Not on file   Social Connections: Not on file   Intimate Partner Violence: Not on file   Housing Stability: Not on file       Current Outpatient Medications:   •  ascorbic acid (VITAMIN C) 500 mg tablet, Take 500 mg by mouth daily, Disp: , Rfl:   •  b complex vitamins tablet, Take 1 tablet by mouth daily, Disp: , Rfl:   •  cholecalciferol (VITAMIN D3) 1,000 units tablet, Take 1,000 Units by mouth daily, Disp: , Rfl:   •  clotrimazole-betamethasone (LOTRISONE) 1-0.05 % cream, Apply topically 2 (two) times a day (Patient not taking: Reported on 9/21/2022), Disp: 30 g, Rfl: 0  •  Diclofenac Sodium (VOLTAREN) 1 %, Apply 2 g topically 4 (four) times a day, Disp: 50 g, Rfl: 0  •  ketoconazole (NIZORAL) 2 % shampoo, Apply 1 application topically daily (Patient not taking: Reported on 9/21/2022),  Disp: 120 mL, Rfl: 1  •  meloxicam (Mobic) 7.5 mg tablet, Take 1 tablet (7.5 mg total) by mouth daily, Disp: 90 tablet, Rfl: 1  •  methocarbamol (ROBAXIN) 500 mg tablet, Take 1 tablet (500 mg total) by mouth daily at bedtime, Disp: 90 tablet, Rfl: 0  •  metoprolol tartrate (LOPRESSOR) 50 mg tablet, Take 1 tablet (50 mg total) by mouth once for 1 dose Please take 2 hour before cardiac CTA, Disp: 1 tablet, Rfl: 0  •  Specialty Vitamins Products (BIOTIN PLUS KERATIN PO), Take by mouth in the morning, Disp: , Rfl:   •  Turmeric POWD, Use in the morning, Disp: , Rfl:   Allergies as of 04/19/2024   • (No Known Allergies)         The following portions of the patient's history were reviewed and updated as appropriate: allergies, current medications, past family history, past medical history, past social history, past surgical history, and problem list.    Review of Systems   Constitutional: Negative.    Musculoskeletal:  Positive for back pain.   Neurological:  Positive for paresthesias.   Psychiatric/Behavioral: Negative.         Physical Exam:  Physical Exam  Vitals reviewed.   Constitutional:       Appearance: Normal appearance.   Cardiovascular:      Rate and Rhythm: Normal rate.      Pulses: Normal pulses.   Pulmonary:      Effort: Pulmonary effort is normal.   Chest:      Chest wall: Tenderness present.       Musculoskeletal:      Thoracic back: Spasms and tenderness present. Decreased range of motion.      Lumbar back: Spasms and tenderness present. Decreased range of motion. Negative right straight leg raise test and negative left straight leg raise test.        Back:       Comments: Pelvic obliquity noted with the leg with any quality internal rotation of the left innominate on sacrum sacral base rotation on the right.  Tenderness noted upon palpation of the anterior chest on the left over the area of the costal cartilage.   Skin:     General: Skin is warm and dry.   Neurological:      General: No focal deficit  present.      Mental Status: He is alert and oriented to person, place, and time.      Sensory: Sensation is intact.      Motor: Motor function is intact.      Gait: Gait is intact.      Deep Tendon Reflexes: Reflexes are normal and symmetric.   Psychiatric:         Mood and Affect: Mood normal.         Behavior: Behavior normal.         Thought Content: Thought content normal.     I reviewed diagnostics in detail.  Recent EMG revealed no electrodiagnostic evidence of potential tarsal tunnel syndrome or polyneuropathy.  There was electrodiagnostic evidence of a subacute to acute left L5-S1 radiculopathy seen.  Recent lumbar radiographs revealed minimal degenerative changes.  He 2020 MRI films but no radiology report did reveal L3-L4 disc protrusion without any apparent compressive pathology.    Assessment:  Diagnoses and all orders for this visit:    Chronic bilateral low back pain with left-sided sciatica    Costal chondritis  -     Ambulatory Referral to Chiropractic    Lumbosacral radiculopathy  -     Ambulatory Referral to Chiropractic    Chronic left-sided thoracic back pain         Treatment:  88917  Manipulation to the right innominate, sacrum, L5 L4 via Penaloza drop maneuver with flexion distraction being performed at L4-L5 and L5-S1.  Manipulation T4 T6 well-tolerated.    Discussion:  I reviewed past medical notes including diagnostics.  Discussed the case with the patient in detail today.  Offered a period of chiropractic treatment in an effort to stabilize lumbosacral spine attention to thoracic dysfunctions and any myofascial involvement associated with the anterior rib cage.  We will reassess in 4 weeks.  No follow-ups on file.

## 2024-05-10 ENCOUNTER — APPOINTMENT (OUTPATIENT)
Dept: LAB | Facility: CLINIC | Age: 44
End: 2024-05-10
Payer: COMMERCIAL

## 2024-05-10 DIAGNOSIS — R07.89 OTHER CHEST PAIN: ICD-10-CM

## 2024-05-10 LAB
ALBUMIN SERPL BCP-MCNC: 4.7 G/DL (ref 3.5–5)
ALP SERPL-CCNC: 64 U/L (ref 34–104)
ALT SERPL W P-5'-P-CCNC: 43 U/L (ref 7–52)
ANION GAP SERPL CALCULATED.3IONS-SCNC: 9 MMOL/L (ref 4–13)
AST SERPL W P-5'-P-CCNC: 28 U/L (ref 13–39)
BILIRUB SERPL-MCNC: 0.79 MG/DL (ref 0.2–1)
BUN SERPL-MCNC: 15 MG/DL (ref 5–25)
CALCIUM SERPL-MCNC: 9.5 MG/DL (ref 8.4–10.2)
CHLORIDE SERPL-SCNC: 101 MMOL/L (ref 96–108)
CO2 SERPL-SCNC: 31 MMOL/L (ref 21–32)
CREAT SERPL-MCNC: 0.88 MG/DL (ref 0.6–1.3)
GFR SERPL CREATININE-BSD FRML MDRD: 105 ML/MIN/1.73SQ M
GLUCOSE P FAST SERPL-MCNC: 95 MG/DL (ref 65–99)
POTASSIUM SERPL-SCNC: 4 MMOL/L (ref 3.5–5.3)
PROT SERPL-MCNC: 7.7 G/DL (ref 6.4–8.4)
SODIUM SERPL-SCNC: 141 MMOL/L (ref 135–147)

## 2024-05-10 PROCEDURE — 80053 COMPREHEN METABOLIC PANEL: CPT

## 2024-05-10 PROCEDURE — 36415 COLL VENOUS BLD VENIPUNCTURE: CPT

## 2024-05-13 ENCOUNTER — PROCEDURE VISIT (OUTPATIENT)
Age: 44
End: 2024-05-13
Payer: COMMERCIAL

## 2024-05-13 VITALS
SYSTOLIC BLOOD PRESSURE: 124 MMHG | HEIGHT: 68 IN | BODY MASS INDEX: 27.28 KG/M2 | HEART RATE: 80 BPM | DIASTOLIC BLOOD PRESSURE: 78 MMHG | WEIGHT: 180 LBS

## 2024-05-13 DIAGNOSIS — M54.42 CHRONIC BILATERAL LOW BACK PAIN WITH LEFT-SIDED SCIATICA: Primary | ICD-10-CM

## 2024-05-13 DIAGNOSIS — G89.29 CHRONIC LEFT-SIDED THORACIC BACK PAIN: ICD-10-CM

## 2024-05-13 DIAGNOSIS — M54.17 LUMBOSACRAL RADICULOPATHY: ICD-10-CM

## 2024-05-13 DIAGNOSIS — G89.29 CHRONIC BILATERAL LOW BACK PAIN WITH LEFT-SIDED SCIATICA: Primary | ICD-10-CM

## 2024-05-13 DIAGNOSIS — M54.6 CHRONIC LEFT-SIDED THORACIC BACK PAIN: ICD-10-CM

## 2024-05-13 DIAGNOSIS — M94.0 COSTAL CHONDRITIS: ICD-10-CM

## 2024-05-13 PROCEDURE — 98941 CHIROPRACT MANJ 3-4 REGIONS: CPT | Performed by: CHIROPRACTOR

## 2024-05-13 PROCEDURE — 97110 THERAPEUTIC EXERCISES: CPT | Performed by: CHIROPRACTOR

## 2024-05-13 NOTE — PROGRESS NOTES
HPI:  Robert returns for treatment of ongoing symptomatology anterior chest pain slightly improved on the left-hand side however his left-sided low back pain and the lower extremity symptoms continue as before.  They are continuing to be functionally limiting.      The following portions of the patient's history were reviewed and updated as appropriate: allergies, current medications, past family history, past medical history, past social history, past surgical history, and problem list.    Review of Systems    Physical Exam:  Exam today reveals him in no distress he can move about the exam without difficulty does have erector spinae hypertonicity noted on the left tender to palpation especially in the L3-L4 L4-L5 paraspinal regions.  Range of motion reduced on extension left lateral bending and full forward flexion.  Biomechanically joint dysfunction noted left sacroiliac joint involvement L5-S1 L3-L4.  Neurologically remained stable lower extremity.  Tightness and stiffness left hip.  Examination of thoracic spine reveals tenderness anterior rib cage on the left biomechanically joint dysfunction T6-T7    Assessment:   Diagnosis ICD-10-CM Associated Orders   1. Chronic bilateral low back pain with left-sided sciatica  M54.42     G89.29       2. 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 Lumbosacral radiculopathy  M54.17       4. Chronic left-sided thoracic back pain  M54.6     G89.29                 Treatment: 95453  Manipulation today to the left innominate, sacrum, L5 levels via Penaloza drop maneuver and then side posture maneuver producing good joint release well-tolerated.  Manipulation T6 producing the joint release well-tolerated.    Extensive therapeutic stretching today utilizing Graston technique.  I used GT 2, GT 3, and GT for instruments.  The left hip region was addressed this was well-tolerated.  I also performed release over the anterior rib cage on the lower left also well-tolerated.  Therapeutic  stretching of the bilateral hips and lower extremity all well-tolerated 15-minute procedure today.    Discussion:  Continues daily stretching he has upcoming appointment with pain management as well as physical therapy.

## 2024-05-15 NOTE — PROGRESS NOTES
HPI:  Robert returns for treatment reports ongoing symptomatology left low back into the left lower extremity with extreme burning in the left foot along with left-sided costal pain mid back stiffness noted.  Remains activity limiting.    The following portions of the patient's history were reviewed and updated as appropriate: allergies, current medications, past family history, past medical history, past social history, past surgical history, and problem list.    Review of Systems    Physical Exam:  Exam reveals pelvic obliquity elevated left versus right innominate leg with inequality is identified.  Left-sided parasacral tenderness upon palpation with active triggers noted.  Biomechanically joint dysfunction left SI involvement L4-L5 L5-S1.    Parathoracic hypertonicity noted T4 T5-T6-T7 segmental dysfunctions are noted left anterior costal tenderness remains.    Assessment:   Diagnosis ICD-10-CM Associated Orders   1. Chronic bilateral low back pain with left-sided sciatica  M54.42     G89.29       2. 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 Lumbosacral radiculopathy  M54.17       4. Chronic left-sided thoracic back pain  M54.6     G89.29                 Treatment: 43621  Manipulation to the left innominate, sacrum, L5 L4 levels via drop as well as flexion distraction maneuver well-tolerated.  Manipulation T4 T6 producing the joint release well-tolerated.    Therapeutic stretching in the office today utilizing Graston technique.  I used GT 2, GT 3, and GT for instruments addressing the thoracolumbar fascia and bilateral hips this is well-tolerated.    I then performed utilizing GT 3 Graston release to the anterior costal region well-tolerated.  We also used GT 2.    Finally finished stretching bilateral hips and lower extremities well-tolerated by the patient today.  Overall 15-minute procedure.    Discussion:  Continue home program see him back for follow-up.

## 2024-05-17 ENCOUNTER — EVALUATION (OUTPATIENT)
Dept: PHYSICAL THERAPY | Facility: CLINIC | Age: 44
End: 2024-05-17
Payer: COMMERCIAL

## 2024-05-17 ENCOUNTER — CONSULT (OUTPATIENT)
Dept: PAIN MEDICINE | Facility: CLINIC | Age: 44
End: 2024-05-17
Payer: COMMERCIAL

## 2024-05-17 ENCOUNTER — HOSPITAL ENCOUNTER (OUTPATIENT)
Dept: CT IMAGING | Facility: HOSPITAL | Age: 44
End: 2024-05-17
Payer: COMMERCIAL

## 2024-05-17 VITALS
HEIGHT: 68 IN | DIASTOLIC BLOOD PRESSURE: 78 MMHG | WEIGHT: 180 LBS | BODY MASS INDEX: 27.28 KG/M2 | SYSTOLIC BLOOD PRESSURE: 100 MMHG

## 2024-05-17 VITALS — HEART RATE: 60 BPM | SYSTOLIC BLOOD PRESSURE: 101 MMHG | DIASTOLIC BLOOD PRESSURE: 63 MMHG

## 2024-05-17 DIAGNOSIS — R07.81 RIB PAIN: Primary | ICD-10-CM

## 2024-05-17 DIAGNOSIS — G89.29 CHRONIC LEFT-SIDED LOW BACK PAIN WITH LEFT-SIDED SCIATICA: ICD-10-CM

## 2024-05-17 DIAGNOSIS — R07.89 OTHER CHEST PAIN: ICD-10-CM

## 2024-05-17 DIAGNOSIS — E78.2 MIXED HYPERLIPIDEMIA: ICD-10-CM

## 2024-05-17 DIAGNOSIS — R07.89 COSTOCHONDRAL CHEST PAIN: ICD-10-CM

## 2024-05-17 DIAGNOSIS — M54.16 LUMBAR RADICULOPATHY: Primary | ICD-10-CM

## 2024-05-17 DIAGNOSIS — M54.17 LUMBOSACRAL RADICULOPATHY: ICD-10-CM

## 2024-05-17 DIAGNOSIS — R07.2 PRECORDIAL PAIN: ICD-10-CM

## 2024-05-17 DIAGNOSIS — M54.42 CHRONIC LEFT-SIDED LOW BACK PAIN WITH LEFT-SIDED SCIATICA: ICD-10-CM

## 2024-05-17 PROCEDURE — 75574 CT ANGIO HRT W/3D IMAGE: CPT

## 2024-05-17 PROCEDURE — 97162 PT EVAL MOD COMPLEX 30 MIN: CPT

## 2024-05-17 PROCEDURE — 99204 OFFICE O/P NEW MOD 45 MIN: CPT | Performed by: PAIN MEDICINE

## 2024-05-17 PROCEDURE — 97110 THERAPEUTIC EXERCISES: CPT

## 2024-05-17 PROCEDURE — 97530 THERAPEUTIC ACTIVITIES: CPT

## 2024-05-17 RX ORDER — METOPROLOL TARTRATE 1 MG/ML
5 INJECTION, SOLUTION INTRAVENOUS
Status: DISCONTINUED | OUTPATIENT
Start: 2024-05-17 | End: 2024-05-21 | Stop reason: HOSPADM

## 2024-05-17 RX ORDER — NITROGLYCERIN 0.4 MG/1
0.4 TABLET SUBLINGUAL
Status: DISCONTINUED | OUTPATIENT
Start: 2024-05-17 | End: 2024-05-21 | Stop reason: HOSPADM

## 2024-05-17 RX ORDER — GABAPENTIN 300 MG/1
CAPSULE ORAL
Qty: 90 CAPSULE | Refills: 0 | Status: SHIPPED | OUTPATIENT
Start: 2024-05-17

## 2024-05-17 RX ADMIN — NITROGLYCERIN 0.4 MG: 0.4 TABLET, ORALLY DISINTEGRATING SUBLINGUAL at 10:58

## 2024-05-17 RX ADMIN — IOHEXOL 80 ML: 350 INJECTION, SOLUTION INTRAVENOUS at 11:19

## 2024-05-17 NOTE — PROGRESS NOTES
PT Evaluation     Today's date: 2024  Patient name: Robert Tellez  : 1980  MRN: [id number]  Referring provider: Steven Amin DPM  Dx:   Encounter Diagnosis     ICD-10-CM    1. Lumbosacral radiculopathy  M54.17 Ambulatory referral to Physical Therapy                     Assessment  Impairments: abnormal muscle firing, abnormal or restricted ROM, activity intolerance, impaired physical strength, lacks appropriate home exercise program, pain with function, poor posture  and poor body mechanics    Assessment details: Robert Tellez is a pleasant 43 y.o. male presents with signs and symptoms consistent with:   Rib pain  (primary encounter diagnosis)  Lumbosacral radiculopathy    Problem List:  1) Adverse neural tension  2) Posterior derangement  3) T6 rib dysfunction    Comparable signs:  1) Sitting  2) walking    he has posterior derangement on left side and improves with repeated extension with over pressure, T6 rib dysfunction, resulting in worry over not knowing what's wrong and fear of not being able to keep active. These impairments listed above are preventing the patient from participating in functional activity. No further referral appears necessary at this time based upon examination results, and is negative for any red flags. Prognosis is good given HEP compliance and attendance to physical therapy 2x a week.  Positive prognostic indicators include positive attitude toward recovery and good understanding of diagnosis and treatment plan options.  Negative prognostic indicators include chronicity of symptoms and none.  Patent will benefit from skilled physical therapy at this time to address deficits to improve overall function and return to PLOF. Patient verbalized understanding of POC, HEP, and return demonstrated HEP. All questions were answered to patients satisfaction.     Please contact me if you have any questions or recommendations. Thank you for the referral and the opportunity to share in  Robert Tellez's care.            Understanding of Dx/Px/POC: good     Prognosis: good    Goals        Plan  Patient would benefit from: skilled physical therapy  Planned modality interventions: cryotherapy, thermotherapy: hydrocollator packs and TENS    Planned therapy interventions: home exercise program, graded exercise, functional ROM exercises, flexibility, body mechanics training, postural training, patient education, therapeutic activities, therapeutic exercise, manual therapy, joint mobilization, neuromuscular re-education, motor coordination training, graded activity, stretching and strengthening    Frequency: 2x week  Duration in weeks: 8  Treatment plan discussed with: patient and PCP        Subjective Evaluation    History of Present Illness  Mechanism of injury: Patient presents to PT today with L sided chest pain, but everything was negative. He reports pain on L side with leg pain/foot pain and back pain. He reports that the pain is intense in his foot. Patient reports that it has been going on for a while (chest pain has been going on for over a year). Patient is active, and does like to play basketball    Difficulty with: sitting for long period of time, sleeping, gets aggravated throughout the day  Patient Goals  Patient goal: get relief on foot and back pain,  Pain  Pain scale: low back 7/8, leg 9 rib 7.  At worst pain ratin  Location: Low back, thigh, top of the foot  Quality: needle-like and burning (shooting)  Relieving factors: medications (muscle relaxers, try not to take if needed)  Aggravating factors: sitting      Diagnostic Tests  Abnormal MRI: MRI negative for foot.  Abnormal EMG results: see chart.  Treatments  Current treatment: chiropractic      Objective     Concurrent Complaints  Positive for disturbed sleep. Negative for night pain, bladder dysfunction, bowel dysfunction and saddle (S4) numbness    Neurological Testing     Sensation     Lumbar   Left   Intact: light touch    Right    Intact: light touch    Reflexes   Left   Patellar (L4): normal (2+)  Achilles (S1): normal (2+)    Right   Patellar (L4): normal (2+)  Achilles (S1): normal (2+)    Active Range of Motion   Cervical/Thoracic Spine       Thoracic    Flexion:  Restriction level: minimal  Extension:  Restriction level: minimal  Left lateral flexion:  Restriction level: moderate  Left rotation:  Restriction level: minimal  Right rotation:  Restriction level: minimal    Joint Play     Hypomobile: T7, T8, T9, T10, 5th rib, 6th rib, 7th rib and 8th rib   Mechanical Assessment    Cervical      Thoracic      Lumbar    Standing flexion: repeated movements   Pain location:no change  Standing extension: repeated movements  Pain intensity: better  Pain level: decreased  Lying extension: repeated movements  Pain location: centralized  Pain intensity: better  Pain level: decreased    Strength/Myotome Testing     Left Hip   Planes of Motion   Flexion: 4-  Extension: 4-  Abduction: 4-  Adduction: 4-  External rotation: 4-  Internal rotation: 4-    Right Hip   Planes of Motion   Flexion: 4-  Extension: 4-  Abduction: 4-  Adduction: 4-  External rotation: 4-  Internal rotation: 4-    Left Knee   Flexion: 4-  Extension: 4-    Right Knee   Flexion: 4-  Extension: 4-    Right Ankle/Foot   Dorsiflexion: 4-    Tests     Lumbar     Left   Positive crossed SLR.   Negative passive SLR.     Right   Negative crossed SLR.     General Comments:    Lower quarter screen   Hips: unremarkable  Knees: unremarkable  Foot/ankle: unremarkable               POC Expires Auth Status Start Date Expiration Date PT Visit Limit    6/17/24 N/A 5/17/24 N/A 60 PCY   Date 5/17/24       Used 1       Remaining 59          Diagnosis:  L side Radic/ T6 rib dysfunction   Precautions:  none   Comparable signs 1) Walking  2) Sitting   Primary Impairments: 1) Posterior derrangement  2) T6 rib dysfunction   Patient Goals Decrease pain   Manual Therapy 5/17        PPU w/ OP SP         PA  flaco         T/s mobs         Hip mobs         Re-evaluation          Exercise Diary          Therapeutic Exercise         Bike/TM for ROM         PPU 2x10        Hip sags/standing ext         T/s extension                                    Neuromuscular Re-education         Multif NMR         Bridges                                                                Therapeutic Activities         Education  POC, diagnosis, expecations                                            Modalities

## 2024-05-17 NOTE — PROGRESS NOTES
Assessment  1. Lumbar radiculopathy  -     MRI lumbar spine wo contrast; Future; Expected date: 05/17/2024  -     gabapentin (NEURONTIN) 300 mg capsule; Take 1 tablet at bedtime for 3 days, then 1 tablet twice daily for 3 days, then 1 tablet 3 times daily  2. Chronic left-sided low back pain with left-sided sciatica          Robert is a pleasant 43 year old male with Left-sided lumbar radicular pain in the L5 dermatomal distribution accompanied by pain limited weakness numbness and paresthesias.  Patient has participated with PT. Chronic pain with decreased participation with IADLs over the past 3 months.  Has been taking OTC ibuprofen and tylenol  without benefit.      X ray L spine showed mild degeneration         Lifestyle modifications extensively discussed including diet, exercise and weight loss in addition to core strengthening.  Will proceed with multimodal pain therapy plan as noted below:    Trials of at least 3 weeks of NSAIDS: no benefit  Anticoagulation: none  Imaging: MRI L spine  Procedure: defer for now  Medications: start gabapentin 300 mg tid , discussed risks/benefits/alternatives regarding any new medications started at todays visit        My impressions and treatment recommendations were discussed in detail with the patient who verbalized understanding and had no further questions.  Discharge instructions were provided. I personally saw and examined the patient and I agree with the above discussed plan of care.    Plan      New Medications Ordered This Visit   Medications   • gabapentin (NEURONTIN) 300 mg capsule     Sig: Take 1 tablet at bedtime for 3 days, then 1 tablet twice daily for 3 days, then 1 tablet 3 times daily     Dispense:  90 capsule     Refill:  0       Orders Placed This Encounter   Procedures   • MRI lumbar spine wo contrast     Standing Status:   Future     Standing Expiration Date:   5/17/2028     Scheduling Instructions:      There is no preparation for this test.  Please leave your jewelry and valuables at home, wedding rings are the exception. All patients will be required to change into a hospital gown and pants.  Street clothes are not permitted in the MRI.  Magnetic nail polish must be removed prior to arrival for your test. Please bring your insurance cards, a form of photo ID and a list of your medications with you. Arrive 15 minutes prior to your appointment time in order to register. Please bring any prior CT or MRI studies of this area that were not performed at a [facility] facility.            To schedule this appointment, please contact Central Scheduling at [phone].            Prior to your appointment, please make sure you complete the MRI Screening Form when you e-Check in for your appointment. This will be available starting 7 days before your appointment in [facility]. You may receive an e-mail with an activation code if you do not have a [facility] account. If you do not have access to a device, we will complete your screening at your appointment.     Order Specific Question:   What is the patient's sedation requirement?     Answer:   No Sedation     Order Specific Question:   Does the patient have metallic implants?     Answer:   No     Order Specific Question:   Does this procedure require the 3T MRI at [place] or [place]?     Answer:   No     Order Specific Question:   Release to patient through [facility]     Answer:   Immediate     Order Specific Question:   Is order priority selected as STAT?     Answer:   No     Order Specific Question:   Reason for Exam     Answer:   Lumbar radicular pain       History of Present Illness    Robert Tellez is a 43 y.o. male with relevant PMH of HLD,    Presenting to  [facility] for chief complaint of low back and left leg and left foot referred by self  Symptoms have been present for 2 years and increasing in the past 6 months and include left foot pain in the L5- distribution. Quality of pain: moderate to severe  pain, aching/throbbing .  Symptoms are worst: sitting , bending forwards Alleviating factors identifiable by patient are: walking On a scale of 1-10, pain typically increases to 8/10, currently impacting quality of life      Conservative therapy (PT/chiro/accupuncture): completed 6 weeks in the last 6 months, chiro care  Previous treatments: had treatments to foot with  no benefit  Prior surgeries of the spine: no  Bowel/bladder incontinence or saddle anesthesia: no  Relevant imaging: x ray L spine  Anticoagulants: none  Contrast allergy: none    I have personally reviewed and/or updated the patient's past medical history, past surgical history, family history, social history, current medications, allergies, and vital signs today.     Review of Systems   Musculoskeletal:  Positive for arthralgias, back pain and gait problem.   All other systems reviewed and are negative.      Patient Active Problem List   Diagnosis   • Mixed hyperlipidemia   • Costochondral chest pain   • Mass of chest wall, left   • Precordial pain   • Other chest pain   • Radiculopathy, lumbar region       No past medical history on file.    No past surgical history on file.    Family History   Problem Relation Age of Onset   • Hyperlipidemia Mother    • Cancer Mother    • Coronary artery disease Father    • Diabetes Father    • Hyperlipidemia Father        Social History     Occupational History   • Not on file   Tobacco Use   • Smoking status: Never   • Smokeless tobacco: Never   Vaping Use   • Vaping status: Never Used   Substance and Sexual Activity   • Alcohol use: Not Currently   • Drug use: Never   • Sexual activity: Yes     Partners: Female       Current Outpatient Medications on File Prior to Visit   Medication Sig   • ascorbic acid (VITAMIN C) 500 mg tablet Take 500 mg by mouth daily   • b complex vitamins tablet Take 1 tablet by mouth daily   • cholecalciferol (VITAMIN D3) 1,000 units tablet Take 1,000 Units by mouth daily   •  "Diclofenac Sodium (VOLTAREN) 1 % Apply 2 g topically 4 (four) times a day   • meloxicam (Mobic) 7.5 mg tablet Take 1 tablet (7.5 mg total) by mouth daily   • methocarbamol (ROBAXIN) 500 mg tablet Take 1 tablet (500 mg total) by mouth daily at bedtime   • Specialty Vitamins Products (BIOTIN PLUS KERATIN PO) Take by mouth in the morning   • Turmeric POWD Use in the morning   • clotrimazole-betamethasone (LOTRISONE) 1-0.05 % cream Apply topically 2 (two) times a day (Patient not taking: Reported on 9/21/2022)   • ketoconazole (NIZORAL) 2 % shampoo Apply 1 application topically daily (Patient not taking: Reported on 9/21/2022)   • metoprolol tartrate (LOPRESSOR) 50 mg tablet Take 1 tablet (50 mg total) by mouth once for 1 dose Please take 2 hour before cardiac CTA     Current Facility-Administered Medications on File Prior to Visit   Medication   • [COMPLETED] iohexol (OMNIPAQUE) 350 MG/ML injection (MULTI-DOSE) 80 mL   • metoprolol (LOPRESSOR) injection 5 mg   • nitroglycerin (NITROSTAT) SL tablet 0.4 mg       No Known Allergies      Physical Exam    /78   Ht 5' 8\" (1.727 m)   Wt 81.6 kg (180 lb)   BMI 27.37 kg/m²     Constitutional: normal, well developed, well nourished, alert, in no distress and non-toxic and no overt pain behavior.  Eyes: anicteric  HEENT: grossly intact  Neck: supple, symmetric, trachea midline and no masses   Pulmonary:even and unlabored  Cardiovascular:No edema or pitting edema present  Skin:Normal without rashes or lesions and well hydrated  Psychiatric:Mood and affect appropriate  Neurologic:Cranial Nerves II-XII grossly intact Sensation grossly intact; no clonus negative bauer's.      MSK:      Lumbar Spine:  No masses or atrophy,    Range of motion - Decreased extension/flexion  Palpation -  Tenderness to palpation in the lumbar parapsinals   PSIS tenderness no  Jc's/ELIER no  Gaenslen's no  SLR positive left        Strength Right Left   Hip flexion L1,2 5 5   Knee extension " L3 5 5   Ankle dorsiflexion L4 5 5   Great toe extension L5 5 4+   Ankle Plantarflexion S1 5 5       Sensory Exam:  intact to light touch bilateral LE       Reflexes:     Right Left   Biceps 2+ 2+   Triceps 2+ 2+   Brachioradialis 2+ 2+   Patellar 2+ 2+   Achilles 2+ 2+   Babinski neg neg        Gait normal                  Imaging  X ray 2024  There are 5 non rib bearing lumbar vertebral bodies.      There is no evidence of acute fracture or destructive osseous lesion.     Alignment is unremarkable.      There is minimal osteophytosis in the lower lumbar spine. Otherwise no significant lumbar degenerative change noted.     The pedicles appear intact.     Soft tissues are unremarkable.     IMPRESSION:        Minimal degenerative changes. No acute abnormality.

## 2024-05-20 ENCOUNTER — OFFICE VISIT (OUTPATIENT)
Dept: PHYSICAL THERAPY | Facility: CLINIC | Age: 44
End: 2024-05-20
Payer: COMMERCIAL

## 2024-05-20 ENCOUNTER — PROCEDURE VISIT (OUTPATIENT)
Age: 44
End: 2024-05-20
Payer: COMMERCIAL

## 2024-05-20 VITALS
SYSTOLIC BLOOD PRESSURE: 120 MMHG | WEIGHT: 180 LBS | DIASTOLIC BLOOD PRESSURE: 70 MMHG | HEIGHT: 68 IN | BODY MASS INDEX: 27.28 KG/M2 | HEART RATE: 78 BPM

## 2024-05-20 DIAGNOSIS — G89.29 CHRONIC BILATERAL LOW BACK PAIN WITH LEFT-SIDED SCIATICA: Primary | ICD-10-CM

## 2024-05-20 DIAGNOSIS — M54.6 CHRONIC LEFT-SIDED THORACIC BACK PAIN: ICD-10-CM

## 2024-05-20 DIAGNOSIS — M54.42 CHRONIC BILATERAL LOW BACK PAIN WITH LEFT-SIDED SCIATICA: Primary | ICD-10-CM

## 2024-05-20 DIAGNOSIS — M54.17 LUMBOSACRAL RADICULOPATHY: Primary | ICD-10-CM

## 2024-05-20 DIAGNOSIS — M94.0 COSTAL CHONDRITIS: ICD-10-CM

## 2024-05-20 DIAGNOSIS — G89.29 CHRONIC LEFT-SIDED THORACIC BACK PAIN: ICD-10-CM

## 2024-05-20 DIAGNOSIS — M54.17 LUMBOSACRAL RADICULOPATHY: ICD-10-CM

## 2024-05-20 DIAGNOSIS — R07.81 RIB PAIN: ICD-10-CM

## 2024-05-20 PROCEDURE — 98941 CHIROPRACT MANJ 3-4 REGIONS: CPT | Performed by: CHIROPRACTOR

## 2024-05-20 PROCEDURE — 97140 MANUAL THERAPY 1/> REGIONS: CPT

## 2024-05-20 PROCEDURE — 97110 THERAPEUTIC EXERCISES: CPT | Performed by: CHIROPRACTOR

## 2024-05-20 PROCEDURE — 97110 THERAPEUTIC EXERCISES: CPT

## 2024-05-20 NOTE — PROGRESS NOTES
Daily Note     Today's date: 2024  Patient name: Robert Tellez  : 1980  MRN: [id number]  Referring provider: Steven Amin DPM  Dx:   Encounter Diagnosis     ICD-10-CM    1. Lumbosacral radiculopathy  M54.17       2. Rib pain  R07.81                      Subjective: Patient reports that its down at his foot       Objective: See treatment diary below      Assessment: Tolerated treatment well. Patient demonstrated fatigue post treatment, exhibited good technique with therapeutic exercises, and would benefit from continued PT. Decreased symptoms today with most response to loaded extension, He notes neural tension with dorsiflexion at foot. Decreased with nerve glides, and repeated motions       Plan: Continue per plan of care.  Progress treatment as tolerated.         POC Expires Auth Status Start Date Expiration Date PT Visit Limit    24 N/A 24 N/A 60 PCY   Date 24      Used 1 2      Remaining 59 58         Diagnosis:  L side Radic/ T6 rib dysfunction   Precautions:  none   Comparable signs 1) Walking  2) Sitting   Primary Impairments: 1) Posterior derrangement  2) T6 rib dysfunction   Patient Goals Decrease pain   Manual Therapy        PPU w/ OP SP  SP        PA glides  SP       T/s mobs  SP GV        Hip mobs         Re-evaluation          Exercise Diary          Therapeutic Exercise         Bike/TM for ROM         PPU 2x10 2x20  2x15 w/ hips offset        Hip sags/standing ext  2x20       T/s extension  20x       Side glides   2x15       Nerve glides  20x                Neuromuscular Re-education         Multif NMR         Bridges                                                                Therapeutic Activities         Education  POC, diagnosis, expecations                                            Modalities

## 2024-05-20 NOTE — PROGRESS NOTES
HPI:  Robert returns for treatment of ongoing symptomatology anterior chest pain slightly improved on the left-hand side however his left-sided low back pain and the lower extremity symptoms continue as before.  They are continuing to be functionally limiting.    Myles did see pain management.  He is going to start a trial course of gabapentin today.  MRI is ordered.      The following portions of the patient's history were reviewed and updated as appropriate: allergies, current medications, past family history, past medical history, past social history, past surgical history, and problem list.    Review of Systems    Physical Exam:  Exam today reveals him in no distress he can move about the exam without difficulty does have erector spinae hypertonicity noted on the left tender to palpation especially in the L3-L4 L4-L5 paraspinal regions.  Range of motion reduced on extension left lateral bending and full forward flexion.  Biomechanically joint dysfunction noted left sacroiliac joint involvement L5-S1 L3-L4.  Neurologically remained stable lower extremity.  Tightness and stiffness left hip.  Examination of thoracic spine reveals tenderness anterior rib cage on the left biomechanically joint dysfunction T6-T7    Assessment:   Diagnosis ICD-10-CM Associated Orders   1. Chronic bilateral low back pain with left-sided sciatica  M54.42     G89.29       2. 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 Lumbosacral radiculopathy  M54.17       4. Chronic left-sided thoracic back pain  M54.6     G89.29                   Treatment: 00771  Manipulation today to the left innominate, sacrum, L5 levels via Penaloza drop maneuver and then side posture maneuver producing good joint release well-tolerated.  Manipulation T6 producing the joint release well-tolerated.    Extensive therapeutic stretching today utilizing Graston technique.  I used GT 2, GT 3, and GT for instruments.    I also performed release over the anterior rib cage on  the lower left also well-tolerated.  Therapeutic stretching of the bilateral hips and lower extremity all well-tolerated 15-minute procedure today.    Discussion:  Follow-up in 2 weeks we will review the effectiveness of the gabapentin and hopefully be able to review the MRI with him at that time.

## 2024-05-22 ENCOUNTER — OFFICE VISIT (OUTPATIENT)
Dept: PHYSICAL THERAPY | Facility: CLINIC | Age: 44
End: 2024-05-22
Payer: COMMERCIAL

## 2024-05-22 DIAGNOSIS — M54.17 LUMBOSACRAL RADICULOPATHY: Primary | ICD-10-CM

## 2024-05-22 DIAGNOSIS — R07.81 RIB PAIN: ICD-10-CM

## 2024-05-22 PROCEDURE — 97140 MANUAL THERAPY 1/> REGIONS: CPT

## 2024-05-22 PROCEDURE — 97110 THERAPEUTIC EXERCISES: CPT

## 2024-05-22 PROCEDURE — 97530 THERAPEUTIC ACTIVITIES: CPT

## 2024-05-22 NOTE — PROGRESS NOTES
Daily Note     Today's date: 2024  Patient name: Robert Tellez  : 1980  MRN: [id number]  Referring provider: Steven Amin DPM  Dx:   Encounter Diagnosis     ICD-10-CM    1. Lumbosacral radiculopathy  M54.17       2. Rib pain  R07.81                      Subjective: Patient reports that he had less pain post session.       Objective: See treatment diary below      Assessment: Tolerated treatment well. Patient demonstrated fatigue post treatment, exhibited good technique with therapeutic exercises, and would benefit from continued PT. Continues to respond to extenison based exercises with decrease in foot tingling with sustained extension. Limited in hip mobility to this date especially IR/ER which hip mobs addressed. Noted limited dorsiflexion on L side which increases back pain and will address in further sessions.       Plan: Continue per plan of care.  Progress treatment as tolerated.         POC Expires Auth Status Start Date Expiration Date PT Visit Limit    24 N/A 24 N/A 60 PCY   Date 24      Used 1 2      Remaining 59 58         Diagnosis:  L side Radic/ T6 rib dysfunction   Precautions:  none   Comparable signs 1) Walking  2) Sitting   Primary Impairments: 1) Posterior derrangement  2) T6 rib dysfunction   Patient Goals Decrease pain   Manual Therapy       PPU w/ OP SP  SP  SP       PA glides  SP SP       T/s mobs  SP GV  SP GV      Hip mobs   SP GIV       Re-evaluation          Exercise Diary          Therapeutic Exercise         Bike/TM for ROM         PPU 2x10 2x20  2x15 w/ hips offset  20x w/ OP      Hip sags/standing ext  2x20 20x       T/s extension  20x 20x       Side glides   2x15       Nerve glides  20x 20x      Cat cow    20x       Neuromuscular Re-education         Multif NMR         Bridges                                                                Therapeutic Activities         Education  POC, diagnosis, expecations                                             Modalities

## 2024-05-24 ENCOUNTER — TELEPHONE (OUTPATIENT)
Age: 44
End: 2024-05-24

## 2024-05-24 NOTE — TELEPHONE ENCOUNTER
Keke Wyman, RN  Era Jolly; P [facility]  S/w pt and advised pt MRI has been denied due to not having enough PT sessions.  While on the call pt informed nurse of chiro sessions he has been to as well. Nurse reviewed chart, pt had chiro- 4/19, 5/1, 5/13,5/17, 5/20 and PT on 5/17, 5/20, 5/22. Pt would like to see if insurance will change their minds and would like a call later today for update.  Nurse to CB with update at end of day on 5/24.  Pt was appreciative of call.

## 2024-05-24 NOTE — TELEPHONE ENCOUNTER
DEEDEE Russo; [facility]  Attempted to reach pt, LMOM with CB# and OH.          Previous Messages       ----- Message -----  From: Era Jolly  Sent: 5/24/2024   8:33 AM EDT  To: [facility]  Subject: Peer to peer                                    The prior authorization request for this study has not been approved. You can schedule a peer to peer by calling [employer] [phone]  Order# [id number] with the deadline date of Patient is scheduled for 5/29. The insurance determined the following:    [ ] Does not meet Medical Necessity  [ ] No prior imaging  [x ] PT or conservative treatment incomplete - (Patient only did 3 PT sessions so far)  [ ] Missing Labs  [ ] Frequency    Imaging requires six weeks of provider directed treatment to be completed. Supported treatments  include (but are not limited to) drugs for swelling or pain, an in office workout (physical therapy),  and/or oral or injected steroids. This must have been completed in the past three months without  improved symptoms. Contact (via office visit, phone, email, or messaging) must occur after the  treatment is completed. This has not been met because:    You have not completed six weeks of provider directed treatment    If you choose not to complete a peer to peer then please reply to this message to let us know. Please notify your patient and contact central scheduling by calling [phone] to cancel the appointment and cancel the order in Epic.

## 2024-05-24 NOTE — TELEPHONE ENCOUNTER
S/w pt and advised pt per JN approval from insurance has been obtained for pt to proceed with MRI on 5/19.  Auth#[id number].  Pt verbalized understanding and appreciative of call.

## 2024-05-29 ENCOUNTER — OFFICE VISIT (OUTPATIENT)
Dept: PHYSICAL THERAPY | Facility: CLINIC | Age: 44
End: 2024-05-29
Payer: COMMERCIAL

## 2024-05-29 ENCOUNTER — HOSPITAL ENCOUNTER (OUTPATIENT)
Dept: MRI IMAGING | Facility: HOSPITAL | Age: 44
Discharge: HOME | End: 2024-05-29
Attending: PAIN MEDICINE
Payer: COMMERCIAL

## 2024-05-29 DIAGNOSIS — M54.17 LUMBOSACRAL RADICULOPATHY: Primary | ICD-10-CM

## 2024-05-29 DIAGNOSIS — M54.16 LUMBAR RADICULOPATHY: ICD-10-CM

## 2024-05-29 DIAGNOSIS — R07.81 RIB PAIN: ICD-10-CM

## 2024-05-29 PROCEDURE — 97110 THERAPEUTIC EXERCISES: CPT

## 2024-05-29 PROCEDURE — 97530 THERAPEUTIC ACTIVITIES: CPT | Performed by: PHYSICAL THERAPIST

## 2024-05-29 PROCEDURE — 97140 MANUAL THERAPY 1/> REGIONS: CPT

## 2024-05-29 PROCEDURE — 72148 MRI LUMBAR SPINE W/O DYE: CPT

## 2024-05-29 NOTE — PROGRESS NOTES
Daily Note     Today's date: 2024  Patient name: Robert Tellez  : 1980  MRN: [id number]  Referring provider: Steven Amin DPM  Dx:   Encounter Diagnosis     ICD-10-CM    1. Lumbosacral radiculopathy  M54.17       2. Rib pain  R07.81                      Subjective: Patient reports that he had less pain post session.       Objective: See treatment diary below      Assessment: Tolerated treatment well. Patient demonstrated fatigue post treatment, exhibited good technique with therapeutic exercises, and would benefit from continued PT. Limited mobility noted today. He demonstrates improvement with mobilizations and limited in lumbar spine rotation.       Plan: Continue per plan of care.  Progress treatment as tolerated.         POC Expires Auth Status Start Date Expiration Date PT Visit Limit    24 N/A 24 N/A 60 PCY   Date 24      Used 1 2      Remaining 59 58         Diagnosis:  L side Radic/ T6 rib dysfunction   Precautions:  none   Comparable signs 1) Walking  2) Sitting   Primary Impairments: 1) Posterior derrangement  2) T6 rib dysfunction   Patient Goals Decrease pain   Manual Therapy      PPU w/ OP SP  SP  SP  SP     PA glides  SP SP  SP      T/s mobs  SP GV  SP GV SP GV      Hip mobs   SP GIV  SP GIV      Lumbar spine mobs    SP GIV- GV     Re-evaluation          Exercise Diary          Therapeutic Exercise         Bike/TM for ROM    5' TM     PPU 2x10 2x20  2x15 w/ hips offset  20x w/ OP 20x   10x w/ OP     Hip sags/standing ext  2x20 20x  20x     T/s extension  20x 20x  20x     Side glides   2x15       Nerve glides  20x 20x 20x     Cat cow    20x  20x     Neuromuscular Re-education         Multif NMR         Bridges                                                                Therapeutic Activities         Education  POC, diagnosis, expecations                                            Modalities

## 2024-05-31 ENCOUNTER — OFFICE VISIT (OUTPATIENT)
Dept: PHYSICAL THERAPY | Facility: CLINIC | Age: 44
End: 2024-05-31
Payer: COMMERCIAL

## 2024-05-31 DIAGNOSIS — R07.81 RIB PAIN: ICD-10-CM

## 2024-05-31 DIAGNOSIS — M54.17 LUMBOSACRAL RADICULOPATHY: Primary | ICD-10-CM

## 2024-05-31 PROCEDURE — 97110 THERAPEUTIC EXERCISES: CPT

## 2024-05-31 PROCEDURE — 97530 THERAPEUTIC ACTIVITIES: CPT

## 2024-05-31 PROCEDURE — 97140 MANUAL THERAPY 1/> REGIONS: CPT

## 2024-05-31 NOTE — PROGRESS NOTES
Daily Note     Today's date: 2024  Patient name: Robert Tellez  : 1980  MRN: [id number]  Referring provider: Steven Amin DPM  Dx:   Encounter Diagnosis     ICD-10-CM    1. Lumbosacral radiculopathy  M54.17       2. Rib pain  R07.81                      Subjective: Patient reports that he had less pain post session.       Objective: See treatment diary below      Assessment: Tolerated treatment well. Patient demonstrated fatigue post treatment, exhibited good technique with therapeutic exercises, and would benefit from continued PT. Improved with manipulation of lumbar spine with reduction in symptoms. He did not decreased mobility with t/s however improved with open books. Limited hip mobility noted throughout session but did increase foot tingling on L side.       Plan: Continue per plan of care.  Progress treatment as tolerated.         POC Expires Auth Status Start Date Expiration Date PT Visit Limit    24 N/A 24 N/A 60 PCY   Date 24   Used 1 2 3 4 5   Remaining 59 58 57 56 55      Diagnosis:  L side Radic/ T6 rib dysfunction   Precautions:  none   Comparable signs 1) Walking  2) Sitting   Primary Impairments: 1) Posterior derrangement  2) T6 rib dysfunction   Patient Goals Decrease pain   Manual Therapy     PPU w/ OP SP  SP  SP  SP SP    PA glides  SP SP  SP  SP     T/s mobs  SP GV  SP GV SP GV  SP GV    Hip mobs   SP GIV  SP GIV  SP GIV    Lumbar spine mobs    SP GIV- GV SP GIV-GV    Re-evaluation          Exercise Diary          Therapeutic Exercise         Bike/TM for ROM    5' TM 5' TM    PPU 2x10 2x20  2x15 w/ hips offset  20x w/ OP 20x   10x w/ OP 20x       Hip sags/standing ext  2x20 20x  20x 2x20    T/s extension  20x 20x  20x 20x    Open books     20x petra     Side glides   2x15       Nerve glides  20x 20x 20x     Cat cow    20x  20x     Neuromuscular Re-education         Multif NMR         Bridges                                                                 Therapeutic Activities         Education  POC, diagnosis, expecations                                            Modalities

## 2024-06-03 ENCOUNTER — APPOINTMENT (OUTPATIENT)
Dept: PHYSICAL THERAPY | Facility: CLINIC | Age: 44
End: 2024-06-03
Payer: COMMERCIAL

## 2024-06-04 ENCOUNTER — OFFICE VISIT (OUTPATIENT)
Dept: PHYSICAL THERAPY | Facility: CLINIC | Age: 44
End: 2024-06-04
Payer: COMMERCIAL

## 2024-06-04 DIAGNOSIS — R07.81 RIB PAIN: ICD-10-CM

## 2024-06-04 DIAGNOSIS — M54.17 LUMBOSACRAL RADICULOPATHY: Primary | ICD-10-CM

## 2024-06-04 PROCEDURE — 97112 NEUROMUSCULAR REEDUCATION: CPT

## 2024-06-04 PROCEDURE — 97140 MANUAL THERAPY 1/> REGIONS: CPT

## 2024-06-04 PROCEDURE — 97110 THERAPEUTIC EXERCISES: CPT

## 2024-06-04 NOTE — PROGRESS NOTES
Daily Note     Today's date: 2024  Patient name: Robert Tellez  : 1980  MRN: [id number]  Referring provider: Steven Amin DPM  Dx:   Encounter Diagnosis     ICD-10-CM    1. Lumbosacral radiculopathy  M54.17       2. Rib pain  R07.81                      Subjective: Patient reports that he has had pain on and off.       Objective: See treatment diary below      Assessment: Tolerated treatment well. Patient demonstrated fatigue post treatment, exhibited good technique with therapeutic exercises, and would benefit from continued PT. Symptoms are able to decrease but not able to abolish continue with outlined program.       Plan: Continue per plan of care.  Progress treatment as tolerated.         POC Expires Auth Status Start Date Expiration Date PT Visit Limit    24 N/A 24 N/A 60 PCY   Date    Used 6 2 3 4 5   Remaining 54 58 57 56 55      Diagnosis:  L side Radic/ T6 rib dysfunction   Precautions:  none   Comparable signs 1) Walking  2) Sitting   Primary Impairments: 1) Posterior derrangement  2) T6 rib dysfunction   Patient Goals Decrease pain   Manual Therapy    PPU w/ OP SP  SP  SP  SP SP SP    PA glides  SP SP  SP  SP  SP    T/s mobs  SP GV  SP GV SP GV  SP GV SP GV   Hip mobs   SP GIV  SP GIV  SP GIV SP GIV   Lumbar spine mobs    SP GIV- GV SP GIV-GV SP GIV-GV   Re-evaluation          Exercise Diary          Therapeutic Exercise         Bike/TM for ROM    5' TM 5' TM 5' TM    PPU 2x10 2x20  2x15 w/ hips offset  20x w/ OP 20x   10x w/ OP 20x       Hip sags/standing ext  2x20 20x  20x 2x20 2x30   T/s extension  20x 20x  20x 20x 20x   Open books     20x petra  20x petra    Side glides   2x15       Nerve glides  20x 20x 20x  20x   Cat cow    20x  20x     Neuromuscular Re-education         Multif NMR         Bridges                                                                Therapeutic Activities         Education  POC, diagnosis, expecations                                             Modalities

## 2024-06-05 ENCOUNTER — OFFICE VISIT (OUTPATIENT)
Dept: PHYSICAL THERAPY | Facility: CLINIC | Age: 44
End: 2024-06-05
Payer: COMMERCIAL

## 2024-06-05 DIAGNOSIS — R07.81 RIB PAIN: ICD-10-CM

## 2024-06-05 DIAGNOSIS — M54.17 LUMBOSACRAL RADICULOPATHY: Primary | ICD-10-CM

## 2024-06-05 PROCEDURE — 97110 THERAPEUTIC EXERCISES: CPT

## 2024-06-05 PROCEDURE — 97140 MANUAL THERAPY 1/> REGIONS: CPT

## 2024-06-07 ENCOUNTER — TELEPHONE (OUTPATIENT)
Dept: PAIN MEDICINE | Facility: MEDICAL CENTER | Age: 44
End: 2024-06-07

## 2024-06-07 NOTE — TELEPHONE ENCOUNTER
----- Message from Roberto Carlos Fonseca MD sent at 6/6/2024  9:16 PM EDT -----  Right far lateral protrusion at L1-2 potentially contacts right L1 nerve root. Correlate for right L1 radiculopathy.     Central and slightly left paramedian protrusion at L3-4 results in minimal central and mild left lateral recess stenosis.    Please have patient follow up with me to review MRI results

## 2024-06-07 NOTE — TELEPHONE ENCOUNTER
S/W pt.  Advised pt of the same.  Pt asking for results in similar terms.  Advised AR wants him to come in for SOVS to review results.  Pt already scheduled for SOVS on 6/21.  Offered pt sooner appts. Pt declined and needs to check his work schedule.  Pt stated he only has pain on the left side.

## 2024-06-07 NOTE — TELEPHONE ENCOUNTER
Caller: raphael Jones    Doctor: Dr. MILLS    Reason for call: pt returning nurses call    Call back#: [phone]

## 2024-06-10 ENCOUNTER — APPOINTMENT (OUTPATIENT)
Dept: PHYSICAL THERAPY | Facility: CLINIC | Age: 44
End: 2024-06-10
Payer: COMMERCIAL

## 2024-06-12 ENCOUNTER — APPOINTMENT (OUTPATIENT)
Dept: PHYSICAL THERAPY | Facility: CLINIC | Age: 44
End: 2024-06-12
Payer: COMMERCIAL

## 2024-06-19 ENCOUNTER — PROCEDURE VISIT (OUTPATIENT)
Age: 44
End: 2024-06-19
Payer: COMMERCIAL

## 2024-06-19 ENCOUNTER — OFFICE VISIT (OUTPATIENT)
Dept: PHYSICAL THERAPY | Facility: CLINIC | Age: 44
End: 2024-06-19
Payer: COMMERCIAL

## 2024-06-19 VITALS — BODY MASS INDEX: 27.28 KG/M2 | HEIGHT: 68 IN | WEIGHT: 180 LBS

## 2024-06-19 DIAGNOSIS — G89.29 CHRONIC BILATERAL LOW BACK PAIN WITH LEFT-SIDED SCIATICA: Primary | ICD-10-CM

## 2024-06-19 DIAGNOSIS — M54.17 LUMBOSACRAL RADICULOPATHY: Primary | ICD-10-CM

## 2024-06-19 DIAGNOSIS — R07.81 RIB PAIN: ICD-10-CM

## 2024-06-19 DIAGNOSIS — M54.6 CHRONIC LEFT-SIDED THORACIC BACK PAIN: ICD-10-CM

## 2024-06-19 DIAGNOSIS — M54.17 LUMBOSACRAL RADICULOPATHY: ICD-10-CM

## 2024-06-19 DIAGNOSIS — M94.0 COSTAL CHONDRITIS: ICD-10-CM

## 2024-06-19 DIAGNOSIS — M54.42 CHRONIC BILATERAL LOW BACK PAIN WITH LEFT-SIDED SCIATICA: Primary | ICD-10-CM

## 2024-06-19 DIAGNOSIS — G89.29 CHRONIC LEFT-SIDED THORACIC BACK PAIN: ICD-10-CM

## 2024-06-19 PROCEDURE — 98941 CHIROPRACT MANJ 3-4 REGIONS: CPT | Performed by: CHIROPRACTOR

## 2024-06-19 PROCEDURE — 97110 THERAPEUTIC EXERCISES: CPT

## 2024-06-19 PROCEDURE — 97140 MANUAL THERAPY 1/> REGIONS: CPT

## 2024-06-19 PROCEDURE — 99213 OFFICE O/P EST LOW 20 MIN: CPT | Performed by: CHIROPRACTOR

## 2024-06-19 NOTE — PROGRESS NOTES
PT Re-Evaluation     Today's date: 2024  Patient name: Robert Tellez  : 1980  MRN: [id number]  Referring provider: Steven Amin DPM  Dx:   Encounter Diagnosis     ICD-10-CM    1. Lumbosacral radiculopathy  M54.17       2. Rib pain  R07.81                      Assessment  Impairments: abnormal muscle firing, abnormal or restricted ROM, activity intolerance, impaired physical strength, lacks appropriate home exercise program, pain with function, poor posture  and poor body mechanics    Assessment details: RE  Robert presents to PT today with slight improvement in low back pain and radiculopathy. He has slight improvement in symptoms right after he leaves PT, however still continues to have difficulty and pain. He demonstrates continued neural tension. Currently awaiting MRI results and at this point may benefit from follow up with pain management in order to decrease symptoms. At this point his pain is more chemically driven and will benefit from skilled PT after he follows up with pain management. Patient will be placed on PT hold until then, and we will re-evaluate moving forward when he is ready to return.      IE:Robert Tellez is a pleasant 43 y.o. male presents with signs and symptoms consistent with:   Rib pain  (primary encounter diagnosis)  Lumbosacral radiculopathy    Problem List:  1) Adverse neural tension  2) Posterior derangement  3) T6 rib dysfunction    Comparable signs:  1) Sitting  2) walking    he has posterior derangement on left side and improves with repeated extension with over pressure, T6 rib dysfunction, resulting in worry over not knowing what's wrong and fear of not being able to keep active. These impairments listed above are preventing the patient from participating in functional activity. No further referral appears necessary at this time based upon examination results, and is negative for any red flags. Prognosis is good given HEP compliance and attendance to physical  therapy 2x a week.  Positive prognostic indicators include positive attitude toward recovery and good understanding of diagnosis and treatment plan options.  Negative prognostic indicators include chronicity of symptoms and none.  Patent will benefit from skilled physical therapy at this time to address deficits to improve overall function and return to PLOF. Patient verbalized understanding of POC, HEP, and return demonstrated HEP. All questions were answered to patients satisfaction.     Please contact me if you have any questions or recommendations. Thank you for the referral and the opportunity to share in Robert Geoff's care.            Understanding of Dx/Px/POC: good     Prognosis: good    Goals        Plan  Patient would benefit from: skilled physical therapy  Planned modality interventions: cryotherapy, thermotherapy: hydrocollator packs and TENS    Planned therapy interventions: home exercise program, graded exercise, functional ROM exercises, flexibility, body mechanics training, postural training, patient education, therapeutic activities, therapeutic exercise, manual therapy, joint mobilization, neuromuscular re-education, motor coordination training, graded activity, stretching and strengthening    Frequency: 2x week  Duration in weeks: 1  Treatment plan discussed with: patient and PCP      Subjective Evaluation    History of Present Illness  Mechanism of injury: RE 6/19/24 Patient presents to PT today with slight improvement. He is about 50% improved. He notes that he still gets improvement with coming to therapy but doesn't seem like it lasts. He still has some tingling in his thigh all throughout. He notes that he follows up with pain med on Friday. Intense pain occasional y    IE:Patient presents to PT today with L sided chest pain, but everything was negative. He reports pain on L side with leg pain/foot pain and back pain. He reports that the pain is intense in his foot. Patient reports that it  has been going on for a while (chest pain has been going on for over a year). Patient is active, and does like to play basketball    Difficulty with: sitting for long period of time, sleeping, gets aggravated throughout the day  Patient Goals  Patient goal: get relief on foot and back pain, (progressing)  Pain  Pain scale: low back 6/7, leg 6 rib 3.  At worst pain ratin (9 at worst)  Location: Low back, thigh, top of the foot  Quality: needle-like and burning (shooting)  Relieving factors: medications (muscle relaxers, try not to take if needed)  Aggravating factors: sitting      Diagnostic Tests  Abnormal MRI: MRI negative for foot.  Abnormal EMG results: see chart.  Treatments  Current treatment: chiropractic      Objective     Concurrent Complaints  Positive for disturbed sleep. Negative for night pain, bladder dysfunction, bowel dysfunction and saddle (S4) numbness    Neurological Testing     Sensation     Lumbar   Left   Intact: light touch    Right   Intact: light touch    Reflexes   Left   Patellar (L4): normal (2+)  Achilles (S1): normal (2+)    Right   Patellar (L4): normal (2+)  Achilles (S1): normal (2+)    Active Range of Motion   Cervical/Thoracic Spine       Thoracic    Flexion:  Restriction level: minimal  Extension:  Restriction level: minimal  Left lateral flexion:  Restriction level: moderate  Left rotation:  Restriction level: minimal  Right rotation:  Restriction level: minimal    Joint Play     Hypomobile: T7, T8, T9, T10, 5th rib, 6th rib, 7th rib and 8th rib   Mechanical Assessment    Cervical      Thoracic      Lumbar    Standing flexion: repeated movements   Pain location:no change  Standing extension: repeated movements  Pain intensity: better  Pain level: decreased  Lying extension: repeated movements  Pain location: centralized  Pain intensity: better  Pain level: decreased    Strength/Myotome Testing     Left Hip   Planes of Motion   Flexion: 4-  Extension: 4-  Abduction:  4-  Adduction: 4-  External rotation: 4-  Internal rotation: 4-    Right Hip   Planes of Motion   Flexion: 4-  Extension: 4-  Abduction: 4-  Adduction: 4-  External rotation: 4-  Internal rotation: 4-    Left Knee   Flexion: 4-  Extension: 4-    Right Knee   Flexion: 4-  Extension: 4-    Right Ankle/Foot   Dorsiflexion: 4-    Tests     Lumbar     Left   Positive crossed SLR.   Negative passive SLR.     Right   Negative crossed SLR.     General Comments:    Lower quarter screen   Hips: unremarkable  Knees: unremarkable  Foot/ankle: unremarkable               POC Expires Auth Status Start Date Expiration Date PT Visit Limit    6/17/24 N/A 5/17/24 N/A 60 PCY   Date 6/4 6/5 5/29 5/31   Used 6 7  4 5   Remaining 54 53  56 55      Diagnosis:  L side Radic/ T6 rib dysfunction   Precautions:  none   Comparable signs 1) Walking  2) Sitting   Primary Impairments: 1) Posterior derrangement  2) T6 rib dysfunction   Patient Goals Decrease pain   Manual Therapy 6/5 6/19 5/22 5/29 5/31 6/4   PPU w/ OP SP  SP  SP  SP SP SP    PA glides SP  SP SP  SP  SP  SP    T/s mobs SP GV SP GV  SP GV SP GV  SP GV SP GV   Hip mobs   SP GIV  SP GIV  SP GIV SP GIV   Lumbar spine mobs SP GIV-GV    SP GIV- GV SP GIV-GV SP GIV-GV   Re-evaluation   SP       Exercise Diary          Therapeutic Exercise         Bike/TM for ROM    5' TM 5' TM 5' TM    PPU 2x10 2x20   20x w/ OP 20x   10x w/ OP 20x       Hip sags/standing ext  2x20 20x  20x 2x20 2x30   T/s extension   20x  20x 20x 20x   Open books 20x    20x petra  20x petra    Side glides          Nerve glides 20x 20x 20x 20x  20x   Cat cow    20x  20x     Neuromuscular Re-education         Multif NMR         Bridges                                                                Therapeutic Activities         Education  POC, diagnosis, expecations                                            Modalities

## 2024-06-21 ENCOUNTER — APPOINTMENT (OUTPATIENT)
Dept: PHYSICAL THERAPY | Facility: CLINIC | Age: 44
End: 2024-06-21
Payer: COMMERCIAL

## 2024-06-21 ENCOUNTER — OFFICE VISIT (OUTPATIENT)
Dept: PAIN MEDICINE | Facility: CLINIC | Age: 44
End: 2024-06-21
Payer: COMMERCIAL

## 2024-06-21 DIAGNOSIS — M54.16 LUMBAR RADICULOPATHY: Primary | ICD-10-CM

## 2024-06-21 DIAGNOSIS — M51.26 LUMBAR DISC HERNIATION: ICD-10-CM

## 2024-06-21 PROCEDURE — 99214 OFFICE O/P EST MOD 30 MIN: CPT | Performed by: PAIN MEDICINE

## 2024-06-21 NOTE — PROGRESS NOTES
Assessment  1. Lumbar radiculopathy  -     FL spine and pain procedure; Future; Expected date: 06/21/2024  2. Lumbar disc herniation  -     FL spine and pain procedure; Future; Expected date: 06/21/2024          Robert is a pleasant 43 year old male with chronic more than 1 years of Left-sided lumbar radicular pain in the L5 dermatomal distribution MRI based on my review shows L3-4 central disc protrusion with compression in the left lateral recess and EMG consistent with subacute to chronic Left L5-s1 radiculopathy, as he has failed conservative therapy will recommend     Left L4-5, L5-s1 TFESI    The risks of the procedure were discussed in detail.  These risks include infection, increased pain, paralysis, bleeding.  Patient understands the risks and is willing to pursue with the procedure    Continue with gabapentin          Lifestyle modifications extensively discussed including diet, exercise and weight loss in addition to core strengthening.  Will proceed with multimodal pain therapy plan as noted below:    Trials of at least 3 weeks of NSAIDS: no benefit  Anticoagulation: none  Imaging: MRI L spine  Procedure: defer for now  Medications: start gabapentin 300 mg tid , discussed risks/benefits/alternatives regarding any new medications started at todays visit        My impressions and treatment recommendations were discussed in detail with the patient who verbalized understanding and had no further questions.  Discharge instructions were provided. I personally saw and examined the patient and I agree with the above discussed plan of care.    Plan      No orders of the defined types were placed in this encounter.      Orders Placed This Encounter   Procedures   • FL spine and pain procedure     Standing Status:   Future     Standing Expiration Date:   6/21/2028     Order Specific Question:   Reason for Exam:     Answer:   Left L4-5, L5-s1 Transforaminal epidural steroid injection under fluoroscopic guidance      Order Specific Question:   Anticoagulant hold needed?     Answer:   none         History of Present Illness  F/u 6/21/24  Mr. Tellez comes back for follow-up visit reporting pain in his left leg left foot ongoing for the past 2 years and L4-L5 distribution described as aching throbbing currently pain is a 8 out of 10.  He completed his MRI he is here today to discuss his the results.  Has been taking gabapentin 300 mg with benefit he takes 3 times a day.    Initial consult  Robert Tellez is a 43 y.o. male with relevant PMH of HLD,    Presenting to  [facility] for chief complaint of low back and left leg and left foot referred by self  Symptoms have been present for 2 years and increasing in the past 6 months and include left foot pain in the L5- distribution. Quality of pain: moderate to severe pain, aching/throbbing .  Symptoms are worst: sitting , bending forwards Alleviating factors identifiable by patient are: walking On a scale of 1-10, pain typically increases to 8/10, currently impacting quality of life      Conservative therapy (PT/chiro/accupuncture): completed 6 weeks in the last 6 months, chiro care  Previous treatments: had treatments to foot with  no benefit  Prior surgeries of the spine: no  Bowel/bladder incontinence or saddle anesthesia: no  Relevant imaging: x ray L spine  Anticoagulants: none  Contrast allergy: none    I have personally reviewed and/or updated the patient's past medical history, past surgical history, family history, social history, current medications, allergies, and vital signs today.     Review of Systems   Musculoskeletal:  Positive for arthralgias, back pain and gait problem.   All other systems reviewed and are negative.      Patient Active Problem List   Diagnosis   • Mixed hyperlipidemia   • Costochondral chest pain   • Mass of chest wall, left   • Precordial pain   • Other chest pain   • Radiculopathy, lumbar region       No past medical history on  file.    No past surgical history on file.    Family History   Problem Relation Age of Onset   • Hyperlipidemia Mother    • Cancer Mother    • Coronary artery disease Father    • Diabetes Father    • Hyperlipidemia Father        Social History     Occupational History   • Not on file   Tobacco Use   • Smoking status: Never   • Smokeless tobacco: Never   Vaping Use   • Vaping status: Never Used   Substance and Sexual Activity   • Alcohol use: Not Currently   • Drug use: Never   • Sexual activity: Yes     Partners: Female       Current Outpatient Medications on File Prior to Visit   Medication Sig   • ascorbic acid (VITAMIN C) 500 mg tablet Take 500 mg by mouth daily   • b complex vitamins tablet Take 1 tablet by mouth daily   • cholecalciferol (VITAMIN D3) 1,000 units tablet Take 1,000 Units by mouth daily   • clotrimazole-betamethasone (LOTRISONE) 1-0.05 % cream Apply topically 2 (two) times a day (Patient not taking: Reported on 9/21/2022)   • Diclofenac Sodium (VOLTAREN) 1 % Apply 2 g topically 4 (four) times a day   • gabapentin (NEURONTIN) 300 mg capsule Take 1 tablet at bedtime for 3 days, then 1 tablet twice daily for 3 days, then 1 tablet 3 times daily   • ketoconazole (NIZORAL) 2 % shampoo Apply 1 application topically daily (Patient not taking: Reported on 9/21/2022)   • meloxicam (Mobic) 7.5 mg tablet Take 1 tablet (7.5 mg total) by mouth daily   • methocarbamol (ROBAXIN) 500 mg tablet Take 1 tablet (500 mg total) by mouth daily at bedtime   • metoprolol tartrate (LOPRESSOR) 50 mg tablet Take 1 tablet (50 mg total) by mouth once for 1 dose Please take 2 hour before cardiac CTA   • Specialty Vitamins Products (BIOTIN PLUS KERATIN PO) Take by mouth in the morning   • Turmeric POWD Use in the morning     No current facility-administered medications on file prior to visit.       No Known Allergies      Physical Exam    There were no vitals taken for this visit.    Constitutional: normal, well developed, well  nourished, alert, in no distress and non-toxic and no overt pain behavior.  Eyes: anicteric  HEENT: grossly intact  Neck: supple, symmetric, trachea midline and no masses   Pulmonary:even and unlabored  Cardiovascular:No edema or pitting edema present  Skin:Normal without rashes or lesions and well hydrated  Psychiatric:Mood and affect appropriate  Neurologic:Cranial Nerves II-XII grossly intact Sensation grossly intact; no clonus negative bauer's.      MSK:      Lumbar Spine:  No masses or atrophy,    Range of motion - Decreased extension/flexion  Palpation -  Tenderness to palpation in the lumbar parapsinals   PSIS tenderness no  Jc's/ELIER no  Gaenslen's no  SLR positive left        Strength Right Left   Hip flexion L1,2 5 5   Knee extension L3 5 5   Ankle dorsiflexion L4 5 5   Great toe extension L5 5 4+   Ankle Plantarflexion S1 5 5       Sensory Exam:  intact to light touch bilateral LE       Reflexes:     Right Left   Biceps 2+ 2+   Triceps 2+ 2+   Brachioradialis 2+ 2+   Patellar 2+ 2+   Achilles 2+ 2+   Babinski neg neg        Gait normal                  Imaging  X ray 2024  There are 5 non rib bearing lumbar vertebral bodies.      There is no evidence of acute fracture or destructive osseous lesion.     Alignment is unremarkable.      There is minimal osteophytosis in the lower lumbar spine. Otherwise no significant lumbar degenerative change noted.     The pedicles appear intact.     Soft tissues are unremarkable.     IMPRESSION:        Minimal degenerative changes. No acute abnormality.    MRI L spine       MRI L spine 5/29/24  VERTEBRAL BODIES:  There are 5 lumbar type vertebral bodies.  Normal alignment of the lumbar spine.  No spondylolysis or spondylolisthesis. No scoliosis.  No compression fracture.    Normal marrow signal is identified within the visualized bony   structures.  No discrete marrow lesion.     SACRUM:  Normal signal within the sacrum. No evidence of insufficiency or stress  fracture.     DISTAL CORD AND CONUS:  Normal size and signal within the distal cord and conus.     PARASPINAL SOFT TISSUES:  Paraspinal soft tissues are unremarkable.     LOWER THORACIC DISC SPACES:  Normal disc height and signal.  No disc herniation, canal stenosis or foraminal narrowing.     LUMBAR DISC SPACES:     L1-L2: Right far lateral protrusion potentially contacts the extraforaminal right L1 nerve root. Correlate for right L1 radiculopathy.     L2-L3: No central or foraminal narrowing.     L3-L4: Small broad-based central protrusion type disc herniation with mild facet hypertrophy results in minimal central and mild left lateral recess stenosis. Foramina are patent.     L4-L5: Mild annular bulge and minimal facet hypertrophy without central or foraminal narrowing.     L5-S1:  Normal.     OTHER FINDINGS:  None.     IMPRESSION:     Right far lateral protrusion at L1-2 potentially contacts right L1 nerve root. Correlate for right L1 radiculopathy.     Central and slightly left paramedian protrusion at L3-4 results in minimal central and mild left lateral recess stenosis.      EMG 4/24  Chronic left L5-s1 radiculopathy

## 2024-06-21 NOTE — PROGRESS NOTES
HPI:  Robert returns for treatment of ongoing symptomatology anterior chest pain slightly improved on the left-hand side however his left-sided low back pain and the lower extremity symptoms continue as before.  They are continuing to be functionally limiting.    The following portions of the patient's history were reviewed and updated as appropriate: allergies, current medications, past family history, past medical history, past social history, past surgical history, and problem list.    Review of Systems    Physical Exam:  Exam today reveals him in no distress he can move about the exam without difficulty does have erector spinae hypertonicity noted on the left tender to palpation especially in the L3-L4 L4-L5 paraspinal regions.  Range of motion reduced on extension left lateral bending and full forward flexion.  Biomechanically joint dysfunction noted left sacroiliac joint involvement L5-S1 L3-L4.  Neurologically remained stable lower extremity.  Tightness and stiffness left hip.  Examination of thoracic spine reveals tenderness anterior rib cage on the left biomechanically joint dysfunction T6-T7    Assessment:   Diagnosis ICD-10-CM Associated Orders   1. Chronic bilateral low back pain with left-sided sciatica  M54.42     G89.29       2. 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 Lumbosacral radiculopathy  M54.17       4. Chronic left-sided thoracic back pain  M54.6     G89.29                   Treatment: [id number]  Manipulation today to the left innominate, sacrum, L5 levels via Penaloza drop maneuver and then side posture maneuver producing good joint release well-tolerated.  Manipulation T6 producing the joint release well-tolerated.      Discussion:  We reviewed his MRI in the office today.  He was shown the images and we had an at length discussion.  Although an L1-L2 far lateral protrusion exists I feel most likely the L3-L4 left paramedian protrusion is most significant.  We discussed treatment options  including continued conservative care as well as possibility of an epidural at that level.  He is scheduled in pain management for this Friday and they will follow-up in regards to this.  He is able to ask questions further discussion ensued and he felt confident in his understanding.  We will have treatment today and continue with conservative approach in conjunction with physical therapy.  I will see him back in 2 weeks.

## 2024-07-15 ENCOUNTER — HOSPITAL ENCOUNTER (OUTPATIENT)
Dept: RADIOLOGY | Facility: HOSPITAL | Age: 44
Discharge: HOME | End: 2024-07-15
Attending: PAIN MEDICINE
Payer: COMMERCIAL

## 2024-07-15 VITALS
OXYGEN SATURATION: 97 % | RESPIRATION RATE: 18 BRPM | HEART RATE: 66 BPM | SYSTOLIC BLOOD PRESSURE: 124 MMHG | TEMPERATURE: 98.2 F | DIASTOLIC BLOOD PRESSURE: 81 MMHG

## 2024-07-15 DIAGNOSIS — M51.26 LUMBAR DISC HERNIATION: ICD-10-CM

## 2024-07-15 DIAGNOSIS — M54.16 LUMBAR RADICULOPATHY: ICD-10-CM

## 2024-07-15 RX ORDER — 0.9 % SODIUM CHLORIDE 0.9 %
1 VIAL (ML) INJECTION ONCE
Status: DISCONTINUED | OUTPATIENT
Start: 2024-07-15 | End: 2024-07-16 | Stop reason: HOSPADM

## 2024-07-15 RX ORDER — BUPIVACAINE HCL/PF 2.5 MG/ML
10 VIAL (ML) INJECTION ONCE
Status: COMPLETED | OUTPATIENT
Start: 2024-07-15 | End: 2024-07-15

## 2024-07-15 RX ORDER — LIDOCAINE HYDROCHLORIDE 10 MG/ML
10 INJECTION, SOLUTION EPIDURAL; INFILTRATION; INTRACAUDAL; PERINEURAL ONCE
Status: COMPLETED | OUTPATIENT
Start: 2024-07-15 | End: 2024-07-15

## 2024-07-15 RX ADMIN — BETAMETHASONE SODIUM PHOSPHATE AND BETAMETHASONE ACETATE 18 MG: 3; 3 INJECTION, SUSPENSION INTRA-ARTICULAR; INTRALESIONAL; INTRAMUSCULAR at 11:06

## 2024-07-15 RX ADMIN — BUPIVACAINE HYDROCHLORIDE 2 ML: 2.5 INJECTION, SOLUTION EPIDURAL; INFILTRATION; INTRACAUDAL at 11:06

## 2024-07-15 RX ADMIN — LIDOCAINE HYDROCHLORIDE 8 ML: 10 INJECTION, SOLUTION EPIDURAL; INFILTRATION; INTRACAUDAL; PERINEURAL at 11:04

## 2024-07-15 RX ADMIN — IOHEXOL 3 ML: 300 INJECTION, SOLUTION INTRAVENOUS at 11:06

## 2024-07-15 NOTE — DISCHARGE INSTR - LAB
Epidural Steroid Injection   WHAT YOU NEED TO KNOW:   An epidural steroid injection ([place]) is a procedure to inject steroid medicine into the epidural space. The epidural space is between your spinal cord and vertebrae. Steroids reduce inflammation and fluid buildup in your spine that may be causing pain. You may be given pain medicine along with the steroids.          ACTIVITY  Do not drive or operate machinery today.  No strenuous activity today - bending, lifting, etc.  You may resume normal activites starting tomorrow - start slowly and as tolerated.  You may shower today, but no tub baths or hot tubs.  You may have numbness for several hours from the local anesthetic. Please use caution and common sense, especially with weight-bearing activities.    CARE OF THE INJECTION SITE  If you have soreness or pain, apply ice to the area today (20 minutes on/20 minutes off).  Starting tomorrow, you may use warm, moist heat or ice if needed.  You may have an increase or change in your discomfort for 36-48 hours after your treatment.  Apply ice and continue with any pain medication you have been prescribed.  Notify the [facility] if you have any of the following: redness, drainage, swelling, headache, stiff neck or fever above 100°F.    SPECIAL INSTRUCTIONS  Our office will contact you in approximately 14 days for a progress report.    MEDICATIONS  Continue to take all routine medications.  Our office may have instructed you to hold some medications.    As no general anesthesia was used in today's procedure, you should not experience any side effects related to anesthesia.     If you are diabetic, the steroids used in today's injection may temporarily increase your blood sugar levels after the first few days after your injection. Please keep a close eye on your sugars and alert the doctor who manages your diabetes if your sugars are significantly high from your baseline or you are symptomatic.     If you have a  problem specifically related to your procedure, please call our office at [phone].  Problems not related to your procedure should be directed to your primary care physician.

## 2024-09-04 ENCOUNTER — OFFICE VISIT (OUTPATIENT)
Dept: PAIN MEDICINE | Facility: CLINIC | Age: 44
End: 2024-09-04
Payer: COMMERCIAL

## 2024-09-04 VITALS — HEIGHT: 68 IN | WEIGHT: 180 LBS | BODY MASS INDEX: 27.28 KG/M2

## 2024-09-04 DIAGNOSIS — M54.16 LUMBAR RADICULOPATHY: ICD-10-CM

## 2024-09-04 DIAGNOSIS — M51.26 LUMBAR DISC HERNIATION: Primary | ICD-10-CM

## 2024-09-04 PROCEDURE — 99214 OFFICE O/P EST MOD 30 MIN: CPT | Performed by: PAIN MEDICINE

## 2024-09-04 NOTE — PROGRESS NOTES
Assessment  1. Lumbar disc herniation  2. Lumbar radiculopathy  -     FL spine and pain procedure; Future; Expected date: 09/04/2024  -     Ambulatory referral to Neurosurgery; Future        Pleasant 43-year-old male with history of more than 1 year history of left-sided lumbar radicular pain in the L4-5 distribution L3-4 central disc protrusion with compression of left lateral recess EMG consider with subacute to chronic left L5-S1 radiculopathy he has had good benefit with last epidural 7/15/2024 will repeat epidural at L3-4 and L4-5 on the left side.  Will also refer to neurosurgery given the chronicity of his pain symptoms and modest improvement with epidural steroid injections.        The risks of the procedure were discussed in detail.  These risks include infection, increased pain, paralysis, bleeding.  Patient understands the risks and is willing to pursue with the procedure    Continue with gabapentin          Lifestyle modifications extensively discussed including diet, exercise and weight loss in addition to core strengthening.  Will proceed with multimodal pain therapy plan as noted below:    Trials of at least 3 weeks of NSAIDS: no benefit  Anticoagulation: none  Imaging: MRI L spine  Procedure: defer for now  Medications: start gabapentin 300 mg tid , discussed risks/benefits/alternatives regarding any new medications started at todays visit        My impressions and treatment recommendations were discussed in detail with the patient who verbalized understanding and had no further questions.  Discharge instructions were provided. I personally saw and examined the patient and I agree with the above discussed plan of care.    Plan      No orders of the defined types were placed in this encounter.      Orders Placed This Encounter   Procedures   • FL spine and pain procedure     Standing Status:   Future     Standing Expiration Date:   9/4/2028     Order Specific Question:   Reason for Exam:      Answer:   left L3-4, 4-5 transforaminal epidural     Order Specific Question:   Anticoagulant hold needed?     Answer:   none   • Ambulatory referral to Neurosurgery     Standing Status:   Future     Standing Expiration Date:   9/4/2025     Referral Priority:   Routine     Referral Type:   Consult - AMB     Referral Reason:   Specialty Services Required     Referred to Provider:   Gibson Healy MD     Requested Specialty:   Neurosurgery     Number of Visits Requested:   1     Expiration Date:   9/4/2025           History of Present Illness  F/u 9/4/24  Mr. Tellez comes for follow-up he status post left L4-5 L5-S1 transforaminal epidural steroid injection completed 7/15/2024 reports about 4 weeks of 50% or more benefit in his left anterior thigh shin and foot he reports his symptoms are now more present in the top of the shin and foot with numbness and tingling and also in his left buttocks.      F/u 6/21/24  Mr. Tellez comes back for follow-up visit reporting pain in his left leg left foot ongoing for the past 2 years and L4-L5 distribution described as aching throbbing currently pain is a 8 out of 10.  He completed his MRI he is here today to discuss his the results.  Has been taking gabapentin 300 mg with benefit he takes 3 times a day.    Initial consult  Robert Tellez is a 44 y.o. male with relevant PMH of HLD,    Presenting to  [facility] for chief complaint of low back and left leg and left foot referred by self  Symptoms have been present for 2 years and increasing in the past 6 months and include left foot pain in the L5- distribution. Quality of pain: moderate to severe pain, aching/throbbing .  Symptoms are worst: sitting , bending forwards Alleviating factors identifiable by patient are: walking On a scale of 1-10, pain typically increases to 8/10, currently impacting quality of life      Conservative therapy (PT/chiro/accupuncture): completed 6 weeks in the last 6 months, chiro  care  Previous treatments: had treatments to foot with  no benefit  Prior surgeries of the spine: no  Bowel/bladder incontinence or saddle anesthesia: no  Relevant imaging: x ray L spine  Anticoagulants: none  Contrast allergy: none    I have personally reviewed and/or updated the patient's past medical history, past surgical history, family history, social history, current medications, allergies, and vital signs today.     Review of Systems   Musculoskeletal:  Positive for arthralgias, back pain and gait problem.   All other systems reviewed and are negative.      Patient Active Problem List   Diagnosis   • Mixed hyperlipidemia   • Costochondral chest pain   • Mass of chest wall, left   • Precordial pain   • Other chest pain   • Radiculopathy, lumbar region       No past medical history on file.    No past surgical history on file.    Family History   Problem Relation Age of Onset   • Hyperlipidemia Mother    • Cancer Mother    • Coronary artery disease Father    • Diabetes Father    • Hyperlipidemia Father        Social History     Occupational History   • Not on file   Tobacco Use   • Smoking status: Never   • Smokeless tobacco: Never   Vaping Use   • Vaping status: Never Used   Substance and Sexual Activity   • Alcohol use: Not Currently   • Drug use: Never   • Sexual activity: Yes     Partners: Female       Current Outpatient Medications on File Prior to Visit   Medication Sig   • ascorbic acid (VITAMIN C) 500 mg tablet Take 500 mg by mouth daily   • b complex vitamins tablet Take 1 tablet by mouth daily   • cholecalciferol (VITAMIN D3) 1,000 units tablet Take 1,000 Units by mouth daily   • Diclofenac Sodium (VOLTAREN) 1 % Apply 2 g topically 4 (four) times a day   • gabapentin (NEURONTIN) 300 mg capsule Take 1 tablet at bedtime for 3 days, then 1 tablet twice daily for 3 days, then 1 tablet 3 times daily   • meloxicam (Mobic) 7.5 mg tablet Take 1 tablet (7.5 mg total) by mouth daily   • methocarbamol  "(ROBAXIN) 500 mg tablet Take 1 tablet (500 mg total) by mouth daily at bedtime   • Specialty Vitamins Products (BIOTIN PLUS KERATIN PO) Take by mouth in the morning   • Turmeric POWD Use in the morning   • clotrimazole-betamethasone (LOTRISONE) 1-0.05 % cream Apply topically 2 (two) times a day (Patient not taking: Reported on 9/21/2022)   • ketoconazole (NIZORAL) 2 % shampoo Apply 1 application topically daily (Patient not taking: Reported on 9/21/2022)   • metoprolol tartrate (LOPRESSOR) 50 mg tablet Take 1 tablet (50 mg total) by mouth once for 1 dose Please take 2 hour before cardiac CTA     No current facility-administered medications on file prior to visit.       No Known Allergies      Physical Exam    Ht 5' 8\" (1.727 m)   Wt 81.6 kg (180 lb)   BMI 27.37 kg/m²     Constitutional: normal, well developed, well nourished, alert, in no distress and non-toxic and no overt pain behavior.  Eyes: anicteric  HEENT: grossly intact  Neck: supple, symmetric, trachea midline and no masses   Pulmonary:even and unlabored  Cardiovascular:No edema or pitting edema present  Skin:Normal without rashes or lesions and well hydrated  Psychiatric:Mood and affect appropriate  Neurologic:Cranial Nerves II-XII grossly intact Sensation grossly intact; no clonus negative bauer's.      MSK:      Lumbar Spine:  No masses or atrophy,    Range of motion - Decreased extension/flexion  Palpation -  Tenderness to palpation in the lumbar parapsinals   PSIS tenderness no  Jc's/ELIER no  Gaenslen's no  SLR positive left        Strength Right Left   Hip flexion L1,2 5 5   Knee extension L3 5 5   Ankle dorsiflexion L4 5 5   Great toe extension L5 5 4+   Ankle Plantarflexion S1 5 5       Sensory Exam:  intact to light touch bilateral LE       Reflexes:     Right Left   Biceps 2+ 2+   Triceps 2+ 2+   Brachioradialis 2+ 2+   Patellar 2+ 2+   Achilles 2+ 2+   Babinski neg neg        Gait normal                  Imaging  X ray 2024  There are 5 " non rib bearing lumbar vertebral bodies.      There is no evidence of acute fracture or destructive osseous lesion.     Alignment is unremarkable.      There is minimal osteophytosis in the lower lumbar spine. Otherwise no significant lumbar degenerative change noted.     The pedicles appear intact.     Soft tissues are unremarkable.     IMPRESSION:        Minimal degenerative changes. No acute abnormality.    MRI L spine       MRI L spine 5/29/24  VERTEBRAL BODIES:  There are 5 lumbar type vertebral bodies.  Normal alignment of the lumbar spine.  No spondylolysis or spondylolisthesis. No scoliosis.  No compression fracture.    Normal marrow signal is identified within the visualized bony   structures.  No discrete marrow lesion.     SACRUM:  Normal signal within the sacrum. No evidence of insufficiency or stress fracture.     DISTAL CORD AND CONUS:  Normal size and signal within the distal cord and conus.     PARASPINAL SOFT TISSUES:  Paraspinal soft tissues are unremarkable.     LOWER THORACIC DISC SPACES:  Normal disc height and signal.  No disc herniation, canal stenosis or foraminal narrowing.     LUMBAR DISC SPACES:     L1-L2: Right far lateral protrusion potentially contacts the extraforaminal right L1 nerve root. Correlate for right L1 radiculopathy.     L2-L3: No central or foraminal narrowing.     L3-L4: Small broad-based central protrusion type disc herniation with mild facet hypertrophy results in minimal central and mild left lateral recess stenosis. Foramina are patent.     L4-L5: Mild annular bulge and minimal facet hypertrophy without central or foraminal narrowing.     L5-S1:  Normal.     OTHER FINDINGS:  None.     IMPRESSION:     Right far lateral protrusion at L1-2 potentially contacts right L1 nerve root. Correlate for right L1 radiculopathy.     Central and slightly left paramedian protrusion at L3-4 results in minimal central and mild left lateral recess stenosis.      EMG 4/24  Chronic left  L5-s1 radiculopathy    Procedure  Left L4-5 L5-S1 transforaminal 7/15/2024

## 2024-09-18 ENCOUNTER — TELEPHONE (OUTPATIENT)
Age: 44
End: 2024-09-18

## 2024-09-18 NOTE — TELEPHONE ENCOUNTER
Called patient back, however, the VMB was full and I was unable to leave message. Patient can see any provider he would like. Was originally suppose to be seen as SNPX.

## 2024-09-18 NOTE — TELEPHONE ENCOUNTER
Pt called with extreme frustration over A cancellation for the 24th. Pt would like to see another physician on the 23rd or 24th. Only option I could see was with Dr Goldberg on the 23rd. Message sent to  to see if this was OK and please call pt back to confirm.

## 2024-09-19 NOTE — TELEPHONE ENCOUNTER
9/19/24:  SPOKE TO PT.  WE R/S HIS APPT WITH DR GOLDBERG TO 9/20/24.  HE IS OK WITH SEEING DR GOLDBERG (SURGEON ONLY) HOWEVER HE MAY STILL WANT TO SEE DR MADISON IF NOT COMPLETELY SATISFIED DUE TO ORIGINALLY BEING REFERRED TO [facility].  PLEASE KEEP 10/7/24 OPEN IF YOU CAN FOR THIS PT.  THANK YOU

## 2024-09-20 ENCOUNTER — TELEPHONE (OUTPATIENT)
Dept: PAIN MEDICINE | Facility: CLINIC | Age: 44
End: 2024-09-20

## 2024-09-20 ENCOUNTER — CONSULT (OUTPATIENT)
Dept: NEUROSURGERY | Facility: CLINIC | Age: 44
End: 2024-09-20
Payer: COMMERCIAL

## 2024-09-20 VITALS
SYSTOLIC BLOOD PRESSURE: 112 MMHG | BODY MASS INDEX: 27.28 KG/M2 | HEART RATE: 90 BPM | WEIGHT: 180 LBS | TEMPERATURE: 98 F | HEIGHT: 68 IN | DIASTOLIC BLOOD PRESSURE: 60 MMHG | OXYGEN SATURATION: 95 %

## 2024-09-20 DIAGNOSIS — M54.16 LUMBAR RADICULOPATHY: ICD-10-CM

## 2024-09-20 PROCEDURE — 99204 OFFICE O/P NEW MOD 45 MIN: CPT | Performed by: NEUROLOGICAL SURGERY

## 2024-09-20 NOTE — TELEPHONE ENCOUNTER
----- Message from Roberto Carlos Fonseca MD sent at 9/20/2024 10:40 AM EDT -----  There is a disc protrusion on the right side at L1-2 which usually goes to the right groin and there is also left disc protrusion at L3-4 which would go down to the left shin and toes where his pain is     We already scheduled for an epidural targeting the left side.

## 2024-09-20 NOTE — TELEPHONE ENCOUNTER
Attempted to contact Pt. DELFINOOM. Provided with CB# and OH.    Calling to give Lumbar MRI results

## 2024-09-20 NOTE — PROGRESS NOTES
Review of Systems   Constitutional: Negative.    HENT: Negative.     Eyes: Negative.    Respiratory: Negative.     Cardiovascular: Negative.    Gastrointestinal: Negative.    Endocrine: Negative.    Genitourinary: Negative.    Musculoskeletal:  Positive for arthralgias and back pain.        LBP into left leg and foot  N/T along left leg and foot, middle three toes  - - location and intensity of pain varies - -    Skin: Negative.    Allergic/Immunologic: Negative.    Neurological:  Positive for numbness.   Hematological: Negative.    Psychiatric/Behavioral: Negative.            Current Outpatient Medications:     ascorbic acid (VITAMIN C) 500 mg tablet, Take 500 mg by mouth daily, Disp: , Rfl:     b complex vitamins tablet, Take 1 tablet by mouth daily, Disp: , Rfl:     cholecalciferol (VITAMIN D3) 1,000 units tablet, Take 1,000 Units by mouth daily, Disp: , Rfl:     Diclofenac Sodium (VOLTAREN) 1 %, Apply 2 g topically 4 (four) times a day (Patient taking differently: Apply 2 g topically if needed), Disp: 50 g, Rfl: 0    gabapentin (NEURONTIN) 300 mg capsule, Take 1 tablet at bedtime for 3 days, then 1 tablet twice daily for 3 days, then 1 tablet 3 times daily (Patient taking differently: Take 300 mg by mouth if needed Take 1 tablet at bedtime for 3 days, then 1 tablet twice daily for 3 days, then 1 tablet 3 times daily), Disp: 90 capsule, Rfl: 0    ketoconazole (NIZORAL) 2 % shampoo, Apply 1 application topically daily (Patient taking differently: Apply 1 application. topically if needed), Disp: 120 mL, Rfl: 1    meloxicam (Mobic) 7.5 mg tablet, Take 1 tablet (7.5 mg total) by mouth daily (Patient taking differently: Take 7.5 mg by mouth if needed), Disp: 90 tablet, Rfl: 1    methocarbamol (ROBAXIN) 500 mg tablet, Take 1 tablet (500 mg total) by mouth daily at bedtime (Patient taking differently: Take 500 mg by mouth if needed), Disp: 90 tablet, Rfl: 0    Specialty Vitamins Products (BIOTIN PLUS KERATIN PO), Take  by mouth in the morning, Disp: , Rfl:     Turmeric POWD, Use in the morning, Disp: , Rfl:     clotrimazole-betamethasone (LOTRISONE) 1-0.05 % cream, Apply topically 2 (two) times a day (Patient not taking: Reported on 9/21/2022), Disp: 30 g, Rfl: 0    metoprolol tartrate (LOPRESSOR) 50 mg tablet, Take 1 tablet (50 mg total) by mouth once for 1 dose Please take 2 hour before cardiac CTA (Patient not taking: Reported on 9/20/2024), Disp: 1 tablet, Rfl: 0

## 2024-09-20 NOTE — PROGRESS NOTES
Neurosurgery   Robert Tellez 44 y.o. male MRN: [id number]      Assessment & Plan     Left L4 radiculopathy from a left L3-4 foraminal stenosis which I consider to be likely from a herniated disc at that level.  I discussed the case with Dr. Salazar from radiology, who unfortunately does not have the benefit of the patient's history and physical exam when reading the MRI.  He kindly reviewed the film again and added an addendum indicating that the L4 nerve root could be impinged.  EMG showed L5 and S1 radiculopathy.  EMGs are nonspecific.  In addition this was largely based on paraspinals which is also not a great way to localize a level.  He did get improvement with epidural steroid injections at the levels below namely L4-5 and L5-S1.  I think that the steroid likely covered the L4 nerve root in this circumstance.  However, improvement was only about 1 month in duration.  He has had symptoms of back pain for a long time leg pain for about a year.  I think he has ongoing left L4 nerve root impingement.  I think he is a candidate for a left L3-4 hemilaminotomy foraminotomy and discectomy.  He seems interested in additional pain management interventions.  I told him I would emphasize that I think the L3-4 level on the left is the symptomatic level and he hopes his pain management doctors would consider focal treatment at that level this time.  At 1 year out, I think it is unlikely that this will provide durable benefit for him.  This certainly reasonable option in my opinion, but will of course defer to the pain management folks, since I do not perform the procedure.  The patient understands that.  He asked what the consequences of not undergoing surgery would be.  I think he would likely live with things as they are without improvement if the nerve is not decompressed.  I think it is unlikely that things would worsen for him over time with or without surgery.  I think surgery gives him a chance of improvement at this  "point still.  The patient originally only wanted to see Dr. SANCHEZ, but agreed to be seen by me since I was available sooner.  This was not addressed with the patient directly today.  I will be happy to continue to participate in his care and however he deems fit.    All questions answered     Chief Complaint:  Back pain for many years    HPI    Left leg pain for over 1 year   Into foot on the dorsum of foot on the left  Worse at night, especially in left foot, throbbing  PT and chiropractor without durable benefit  MADISON - helped for about a month  No incontinence  No change with valsalva  No right sided problems  Narcotics in the past helped for the pain  Gabapentin helps a little  Sitting is worse, driving is worse  Standing and walking is ok, more tolerable  Leg pain and foot pain bothers more than the back ever did    NICOLE RIVERA personally reviewed and updated.  Review of Systems    Vitals:    /60 (BP Location: Left arm, Patient Position: Sitting, Cuff Size: Adult)   Pulse 90   Temp 98 °F (36.7 °C) (Temporal)   Ht 5' 8\" (1.727 m)   Wt 81.6 kg (180 lb)   SpO2 95%   BMI 27.37 kg/m²     History reviewed. No pertinent past medical history.    History reviewed. No pertinent surgical history.      Current Outpatient Medications:     ascorbic acid (VITAMIN C) 500 mg tablet, Take 500 mg by mouth daily, Disp: , Rfl:     b complex vitamins tablet, Take 1 tablet by mouth daily, Disp: , Rfl:     cholecalciferol (VITAMIN D3) 1,000 units tablet, Take 1,000 Units by mouth daily, Disp: , Rfl:     Diclofenac Sodium (VOLTAREN) 1 %, Apply 2 g topically 4 (four) times a day (Patient taking differently: Apply 2 g topically if needed), Disp: 50 g, Rfl: 0    gabapentin (NEURONTIN) 300 mg capsule, Take 1 tablet at bedtime for 3 days, then 1 tablet twice daily for 3 days, then 1 tablet 3 times daily (Patient taking differently: Take 300 mg by mouth if needed Take 1 tablet at bedtime for 3 days, then 1 tablet twice daily for 3 days, " then 1 tablet 3 times daily), Disp: 90 capsule, Rfl: 0    ketoconazole (NIZORAL) 2 % shampoo, Apply 1 application topically daily (Patient taking differently: Apply 1 application. topically if needed), Disp: 120 mL, Rfl: 1    meloxicam (Mobic) 7.5 mg tablet, Take 1 tablet (7.5 mg total) by mouth daily (Patient taking differently: Take 7.5 mg by mouth if needed), Disp: 90 tablet, Rfl: 1    methocarbamol (ROBAXIN) 500 mg tablet, Take 1 tablet (500 mg total) by mouth daily at bedtime (Patient taking differently: Take 500 mg by mouth if needed), Disp: 90 tablet, Rfl: 0    Specialty Vitamins Products (BIOTIN PLUS KERATIN PO), Take by mouth in the morning, Disp: , Rfl:     Turmeric POWD, Use in the morning, Disp: , Rfl:     clotrimazole-betamethasone (LOTRISONE) 1-0.05 % cream, Apply topically 2 (two) times a day (Patient not taking: Reported on 9/21/2022), Disp: 30 g, Rfl: 0    metoprolol tartrate (LOPRESSOR) 50 mg tablet, Take 1 tablet (50 mg total) by mouth once for 1 dose Please take 2 hour before cardiac CTA (Patient not taking: Reported on 9/20/2024), Disp: 1 tablet, Rfl: 0      No Known Allergies    Social History     Socioeconomic History    Marital status: /Civil Union     Spouse name: Not on file    Number of children: Not on file    Years of education: Not on file    Highest education level: Not on file   Occupational History    Not on file   Tobacco Use    Smoking status: Never    Smokeless tobacco: Never   Vaping Use    Vaping status: Never Used   Substance and Sexual Activity    Alcohol use: Not Currently    Drug use: Never    Sexual activity: Yes     Partners: Female   Other Topics Concern    Not on file   Social History Narrative    Not on file     Social Determinants of Health     Financial Resource Strain: Not on file   Food Insecurity: Not on file   Transportation Needs: Not on file   Physical Activity: Not on file   Stress: Not on file   Social Connections: Not on file   Intimate Partner  Violence: Not on file   Housing Stability: Not on file            Imaging:  MRI Images and Report of the Lumbar show Left L3/4 foraminal stenosis that could be causing Left L4 radiculopathy per requested addendum.  I think there is an HNP as etiology for that foraminal stenosis      Physical Exam    Well-developed well-nourished  Appears stated age of 44 y.o.  Awake alert oriented x3  Verbally interactive and appropriate  Adequate fund of knowledge  Cranial nerves II through VIII intact   Motor strength 5 out of 5   No pronator drift  Sensory intact to light touch  No sensory changes on the dorsum of the spine.  without pain to palpation over the Cervical, Thoracic, and Lumbar  Reflexes 1+and symmetric  Toes: downgoing  Gait: WNL  Heel walk: WNL  Toe walk:: WNL  Tandem walk: WNL  Straight leg raising: without leg pain  without right sided hip pain with hip rotation  without left sided hip pain with hip rotation  Memory: intact  Mood: Euthymic affect: Full range  Language: Fluent in English  Good peripheral pulses in bilateral lower  extremities

## 2024-09-23 DIAGNOSIS — M54.16 LUMBAR RADICULOPATHY: ICD-10-CM

## 2024-09-23 RX ORDER — GABAPENTIN 300 MG/1
300 CAPSULE ORAL AS NEEDED
Qty: 270 CAPSULE | Refills: 1 | Status: SHIPPED | OUTPATIENT
Start: 2024-09-23

## 2024-10-04 ENCOUNTER — HOSPITAL ENCOUNTER (OUTPATIENT)
Dept: RADIOLOGY | Facility: HOSPITAL | Age: 44
Discharge: HOME | End: 2024-10-04
Attending: PAIN MEDICINE
Payer: COMMERCIAL

## 2024-10-04 VITALS
SYSTOLIC BLOOD PRESSURE: 128 MMHG | RESPIRATION RATE: 18 BRPM | OXYGEN SATURATION: 94 % | DIASTOLIC BLOOD PRESSURE: 76 MMHG | TEMPERATURE: 97.6 F | HEART RATE: 77 BPM

## 2024-10-04 DIAGNOSIS — M54.16 LUMBAR RADICULOPATHY: ICD-10-CM

## 2024-10-04 RX ORDER — BUPIVACAINE HCL/PF 2.5 MG/ML
10 VIAL (ML) INJECTION ONCE
Status: COMPLETED | OUTPATIENT
Start: 2024-10-04 | End: 2024-10-04

## 2024-10-04 RX ORDER — LIDOCAINE HYDROCHLORIDE 10 MG/ML
10 INJECTION, SOLUTION EPIDURAL; INFILTRATION; INTRACAUDAL; PERINEURAL ONCE
Status: COMPLETED | OUTPATIENT
Start: 2024-10-04 | End: 2024-10-04

## 2024-10-04 RX ORDER — 0.9 % SODIUM CHLORIDE 0.9 %
1 VIAL (ML) INJECTION ONCE
Status: DISCONTINUED | OUTPATIENT
Start: 2024-10-04 | End: 2024-10-05 | Stop reason: HOSPADM

## 2024-10-04 RX ADMIN — LIDOCAINE HYDROCHLORIDE 4 ML: 10 INJECTION, SOLUTION EPIDURAL; INFILTRATION; INTRACAUDAL; PERINEURAL at 14:12

## 2024-10-04 RX ADMIN — Medication 6 MG: at 14:13

## 2024-10-04 RX ADMIN — IOHEXOL 2 ML: 300 INJECTION, SOLUTION INTRAVENOUS at 14:13

## 2024-10-04 RX ADMIN — BUPIVACAINE HYDROCHLORIDE 1 ML: 2.5 INJECTION, SOLUTION EPIDURAL; INFILTRATION; INTRACAUDAL at 14:13

## 2024-10-04 NOTE — DISCHARGE INSTR - LAB
Epidural Steroid Injection   WHAT YOU NEED TO KNOW:   An epidural steroid injection (MADISON) is a procedure to inject steroid medicine into the epidural space. The epidural space is between your spinal cord and vertebrae. Steroids reduce inflammation and fluid buildup in your spine that may be causing pain. You may be given pain medicine along with the steroids.          ACTIVITY  Do not drive or operate machinery today.  No strenuous activity today - bending, lifting, etc.  You may resume normal activites starting tomorrow - start slowly and as tolerated.  You may shower today, but no tub baths or hot tubs.  You may have numbness for several hours from the local anesthetic. Please use caution and common sense, especially with weight-bearing activities.    CARE OF THE INJECTION SITE  If you have soreness or pain, apply ice to the area today (20 minutes on/20 minutes off).  Starting tomorrow, you may use warm, moist heat or ice if needed.  You may have an increase or change in your discomfort for 36-48 hours after your treatment.  Apply ice and continue with any pain medication you have been prescribed.  Notify the [facility] if you have any of the following: redness, drainage, swelling, headache, stiff neck or fever above 100°F.    SPECIAL INSTRUCTIONS  Our office will contact you in approximately 14 days for a progress report.    MEDICATIONS  Continue to take all routine medications.  Our office may have instructed you to hold some medications.    As no general anesthesia was used in today's procedure, you should not experience any side effects related to anesthesia.     If you are diabetic, the steroids used in today's injection may temporarily increase your blood sugar levels after the first few days after your injection. Please keep a close eye on your sugars and alert the doctor who manages your diabetes if your sugars are significantly high from your baseline or you are symptomatic.     If you have a  problem specifically related to your procedure, please call our office at [phone].  Problems not related to your procedure should be directed to your primary care physician.

## 2024-10-07 ENCOUNTER — TELEPHONE (OUTPATIENT)
Dept: RADIOLOGY | Facility: HOSPITAL | Age: 44
End: 2024-10-07

## 2024-10-07 NOTE — TELEPHONE ENCOUNTER
----- Message from Roberto Carlos Fonseca MD sent at 10/4/2024  4:41 PM EDT -----  Please schedule follow up in 2 weeks

## 2024-10-18 ENCOUNTER — TELEPHONE (OUTPATIENT)
Dept: PAIN MEDICINE | Facility: MEDICAL CENTER | Age: 44
End: 2024-10-18

## 2024-10-23 NOTE — TELEPHONE ENCOUNTER
Caller: patient    Doctor: AR    Reason for call: Pt reports  50% improvement post injection  Pain level  6/10     Pain is still there   Lower back pain and his foot still having pain     Patient  would like to speak with DR WILLSON only    Call back#:

## 2024-11-21 ENCOUNTER — TELEPHONE (OUTPATIENT)
Dept: NEUROSURGERY | Facility: CLINIC | Age: 44
End: 2024-11-21

## 2024-11-21 NOTE — TELEPHONE ENCOUNTER
11/21/2024- CALLED PT AND LEFT MESSAGE ON MACHINE TO SWITCH THEIR APT TO [place] ON 12/17/2024 AT 10:45.

## 2024-11-26 NOTE — TELEPHONE ENCOUNTER
Pt returned call and confirmed that [place] location change is OK for him.  Address provided along with confirming date and time will remain the same.  Pt verbalized understanding and was appreciative.

## 2024-11-26 NOTE — TELEPHONE ENCOUNTER
2nd attempt made. Left a detailed message informing pt there is a scheduling error Dr. Giraldo will actually be in the [place] office on 12/17. Appt time/date will remain the same. Requested the pt to call back to confirm they are aware the appt is no longer in [place] and to arrive to [place].

## 2024-11-27 ENCOUNTER — TELEPHONE (OUTPATIENT)
Age: 44
End: 2024-11-27

## 2024-11-27 NOTE — TELEPHONE ENCOUNTER
Caller: Robert Tellez    Doctor: Brennan    Reason for call: He has a painful right foot twist.  I got him in on January 23.  Is there any way we can get him in sooner?  Can someone reach out to him?  Thank you.     Call back#: [phone]

## 2024-12-17 ENCOUNTER — OFFICE VISIT (OUTPATIENT)
Dept: NEUROSURGERY | Facility: CLINIC | Age: 44
End: 2024-12-17
Payer: COMMERCIAL

## 2024-12-17 VITALS
HEIGHT: 68 IN | HEART RATE: 68 BPM | TEMPERATURE: 97.8 F | BODY MASS INDEX: 28.95 KG/M2 | DIASTOLIC BLOOD PRESSURE: 92 MMHG | OXYGEN SATURATION: 97 % | RESPIRATION RATE: 16 BRPM | SYSTOLIC BLOOD PRESSURE: 130 MMHG | WEIGHT: 191 LBS

## 2024-12-17 DIAGNOSIS — G89.29 CHRONIC MIDLINE LOW BACK PAIN WITH LEFT-SIDED SCIATICA: ICD-10-CM

## 2024-12-17 DIAGNOSIS — M54.16 LUMBAR RADICULOPATHY: Primary | ICD-10-CM

## 2024-12-17 DIAGNOSIS — M54.42 CHRONIC MIDLINE LOW BACK PAIN WITH LEFT-SIDED SCIATICA: ICD-10-CM

## 2024-12-17 PROCEDURE — 99215 OFFICE O/P EST HI 40 MIN: CPT | Performed by: STUDENT IN AN ORGANIZED HEALTH CARE EDUCATION/TRAINING PROGRAM

## 2024-12-17 NOTE — PROGRESS NOTES
"Assessment & Plan     Active Problems:  There are no active Hospital Problems.       ***    Subjective     Robert Tellez is an 44 y.o. male.    HPI:  ***     No past surgical history on file.    Review of Systems    Objective     Pulse 68   Temp 97.8 °F (36.6 °C)   Resp 16   Ht 5' 8\" (1.727 m)   Wt 86.6 kg (191 lb)   SpO2 97%   BMI 29.04 kg/m²     Physical Exam      .SOC  " done

## 2024-12-17 NOTE — PROGRESS NOTES
Name: Robert Tellez      : 1980      MRN: [id number]  Encounter Provider: Gibson Healy MD  Encounter Date: 2024   Encounter department: [facility]  :  Assessment & Plan  Lumbar radiculopathy         Chronic midline low back pain with left-sided sciatica       This is a 44-year-old male presenting for surgical evaluation for low back pain and left lower extremity pain.    MRI of his lumbar spine demonstrates diffuse degeneration.  The worst is at L3-4 where the patient has a central left paracentral disc herniation resulting in left lateral recess narrowing.  There is also mild central canal stenosis.  There is slight loss of lumbar lordosis at this level.    I had a long discussion with the patient about his symptoms as well as imaging findings.  I believe the patient's symptoms may be stemming from the disease he has at L3-4 especially given that he did get relief with the L3-4 and L4 5 injections.  We discussed treatment options including surgery versus continuing conservative treatment.  Given the patient's age and lack of critical findings on his imaging, I believe continuing conservative treatment would be in the patient's best interest.  As such I advised patient to undergo physical therapy and work on his core as well as his lower back muscles.  In addition I advised him to obtain another injection.  The patient will reach out to my clinic with any questions or concerns.  He will follow-up up with me on a as needed basis.      History of Present Illness   Reports low back pain which began many years ago. Reports left lower back pain. He states there are periods of time where he would get severe flare ups where he would not be able to perform his usual activities. He has done PT and chiropractic in the past. He has also been performing PT at home. In the past year, things have gotten worse. He states he gets pain along with tingling that radiates into the  "left foot. The pain radiates from the left lower back into the medial thigh. The pain is primarily in the left lower back and the foot. He reports the flare ups occur approximately once a month.   HPI  Review of Systems   Constitutional: Negative.    HENT: Negative.     Eyes: Negative.    Respiratory: Negative.     Cardiovascular: Negative.    Gastrointestinal: Negative.    Endocrine: Negative.    Genitourinary: Negative.    Musculoskeletal:  Positive for arthralgias, back pain (left LBP and left thigh pain) and myalgias (thightness in lower back).        Completed PT, chiro   Skin: Negative.    Allergic/Immunologic: Negative.    Neurological:  Positive for numbness (left foot N&T).   Hematological: Negative.    Psychiatric/Behavioral: Negative.      I have personally reviewed the MA's review of systems and made changes as necessary.         Objective   /92   Pulse 68   Temp 97.8 °F (36.6 °C)   Resp 16   Ht 5' 8\" (1.727 m)   Wt 86.6 kg (191 lb)   SpO2 97%   BMI 29.04 kg/m²     Physical Exam  Neurological Exam  General:  Normal, well developed, not in distress/pain     Skin:   No issues, no rashes noted     Musculoskeletal:   5/5 strength throughout all muscle groups  No tenderness to palpation of the spine       Neurologic Exam:  Awake and alert  Oriented x3  Speech clear and fluent  EDWARD   Sensation to light touch and pin prick intact throughout  No bauer's  No clonus  2+ patellar reflexes     Gait:   normal gait, normal posture        Administrative Statements   I have spent a total time of 40 minutes in caring for this patient on the day of the visit/encounter including Diagnostic results, Risks and benefits of tx options, Instructions for management, Patient and family education, Risk factor reductions, Impressions, Counseling / Coordination of care, Documenting in the medical record, Reviewing / ordering tests, medicine, procedures  , and Obtaining or reviewing history  .   "

## 2025-01-23 ENCOUNTER — HOSPITAL ENCOUNTER (OUTPATIENT)
Dept: RADIOLOGY | Facility: HOSPITAL | Age: 45
End: 2025-01-23
Attending: PODIATRIST
Payer: COMMERCIAL

## 2025-01-23 ENCOUNTER — OFFICE VISIT (OUTPATIENT)
Dept: PODIATRY | Facility: CLINIC | Age: 45
End: 2025-01-23
Payer: COMMERCIAL

## 2025-01-23 VITALS
DIASTOLIC BLOOD PRESSURE: 62 MMHG | WEIGHT: 191 LBS | OXYGEN SATURATION: 98 % | HEART RATE: 89 BPM | SYSTOLIC BLOOD PRESSURE: 109 MMHG | HEIGHT: 68 IN | BODY MASS INDEX: 28.95 KG/M2

## 2025-01-23 DIAGNOSIS — M72.2 PLANTAR FASCIITIS, RIGHT: ICD-10-CM

## 2025-01-23 DIAGNOSIS — M79.671 RIGHT FOOT PAIN: ICD-10-CM

## 2025-01-23 DIAGNOSIS — S93.324S LISFRANC DISLOCATION, RIGHT, SEQUELA: Primary | ICD-10-CM

## 2025-01-23 DIAGNOSIS — B07.0 VERRUCA PLANTARIS: ICD-10-CM

## 2025-01-23 PROCEDURE — 17110 DESTRUCTION B9 LES UP TO 14: CPT | Performed by: PODIATRIST

## 2025-01-23 PROCEDURE — 99213 OFFICE O/P EST LOW 20 MIN: CPT | Performed by: PODIATRIST

## 2025-01-23 PROCEDURE — 73630 X-RAY EXAM OF FOOT: CPT

## 2025-01-23 NOTE — PROGRESS NOTES
Assessment/Plan:     The patient's clinical examination today significant for mild tenderness with palpation to the area of the Lisfranc's joint over the dorsal aspect of the right midfoot.  No observable erythema nor edema no calor nor ecchymosis noted today.  Clinical photos from after his initial injury in November 2024 were shown to me on his phone and did show redness and swelling over this area.  The patient does note mild tenderness with passive range of motion through Lisfranc's joint.  Tenderness also presents with prolonged periods of ambulation and weightbearing.  Very mild tenderness is also noted with deep palpation of the plantar medial tubercle of the right os calcis, consistent with plantar fasciitis.  There is no tenderness with lateral squeeze of the calcaneus.  No pulses palpable.  There is no tenderness palpation to the base of the right fifth metatarsal.  There is no erythema nor edema no counter ecchymosis to the lateral right midfoot.    There is also a mildly tender callus lesion to the plantar aspect of his right great toe.  Debridement of this lesion revealed pinpoint hematomas consistent with a plantar verruca.    X-rays of the patient's right foot taken today were personally viewed interpreted.  It does show a subacute fracture at the base of the right fifth metatarsal which is clinically asymptomatic at this time.  There is a split at the level of the Lisfranc joint.  I am unsure as to whether this is anatomic or resulting from his injury in November 2024.    As the patient is almost 3 months out from his initial injury and is still noticing tenderness in the area of Lisfranc's joint, I am concerned that he may have suffered a dislocation.  An MRI was ordered to assess the ligaments in this area of the foot.  MRI results, we can consider ORIF of Lisfranc's joint.    The plantar verruca was debrided with the stoma 15 blade to pinpoint bleeding.  It was then treated chemically with  application of Cantharone.  A Annabelle dressing was then applied and is maintained for the next 48 hours.    Recommend follow-up in 3 weeks to review MRI results and to recheck the plantar great toe verruca.       Diagnoses and all orders for this visit:    Lisfranc dislocation, right, sequela  -     MRI foot/forefoot toes right wo contrast; Future    Right foot pain  -     XR foot 3+ vw right; Future  -     MRI foot/forefoot toes right wo contrast; Future    Verruca plantaris  -     Lesion Destruction    Plantar fasciitis, right          Subjective:     Patient ID: Robert Tellez is a 44 y.o. male.    Patient patient presents today with a chief complaint of right midfoot pain.  He initially sustained a twisting injury of his right foot back in November 2024.  He noted increased redness and swelling to the top of his right foot that has since resolved.  He does still note some persistent tenderness in his right foot most noticeable during prolonged periods of ambulation and weightbearing.  He does note some occasional tenderness along his plantar heel as well.  He also notes a mildly tender callus lesion to the plantar aspect of his right great toe has just recently.      PAST MEDICAL HISTORY:  History reviewed. No pertinent past medical history.    PAST SURGICAL HISTORY:  History reviewed. No pertinent surgical history.     ALLERGIES:  Patient has no known allergies.    MEDICATIONS:  Current Outpatient Medications   Medication Sig Dispense Refill    ascorbic acid (VITAMIN C) 500 mg tablet Take 500 mg by mouth daily      b complex vitamins tablet Take 1 tablet by mouth daily      cholecalciferol (VITAMIN D3) 1,000 units tablet Take 1,000 Units by mouth daily      Diclofenac Sodium (VOLTAREN) 1 % Apply 2 g topically 4 (four) times a day (Patient taking differently: Apply 2 g topically if needed) 50 g 0    gabapentin (NEURONTIN) 300 mg capsule Take 1 capsule (300 mg total) by mouth if needed (pain) Take 1 tablet at  bedtime for 3 days, then 1 tablet twice daily for 3 days, then 1 tablet 3 times daily 270 capsule 1    ketoconazole (NIZORAL) 2 % shampoo Apply 1 application topically daily (Patient taking differently: Apply 1 application. topically if needed) 120 mL 1    meloxicam (Mobic) 7.5 mg tablet Take 1 tablet (7.5 mg total) by mouth daily (Patient taking differently: Take 7.5 mg by mouth if needed) 90 tablet 1    methocarbamol (ROBAXIN) 500 mg tablet Take 1 tablet (500 mg total) by mouth daily at bedtime (Patient taking differently: Take 500 mg by mouth if needed) 90 tablet 0    Specialty Vitamins Products (BIOTIN PLUS KERATIN PO) Take by mouth in the morning      Turmeric POWD Use in the morning      clotrimazole-betamethasone (LOTRISONE) 1-0.05 % cream Apply topically 2 (two) times a day (Patient not taking: Reported on 9/21/2022) 30 g 0    metoprolol tartrate (LOPRESSOR) 50 mg tablet Take 1 tablet (50 mg total) by mouth once for 1 dose Please take 2 hour before cardiac CTA (Patient not taking: Reported on 9/20/2024) 1 tablet 0     No current facility-administered medications for this visit.       SOCIAL HISTORY:  Social History     Socioeconomic History    Marital status: /Civil Union     Spouse name: None    Number of children: None    Years of education: None    Highest education level: None   Occupational History    None   Tobacco Use    Smoking status: Never    Smokeless tobacco: Never   Vaping Use    Vaping status: Never Used   Substance and Sexual Activity    Alcohol use: Not Currently    Drug use: Never    Sexual activity: Yes     Partners: Female   Other Topics Concern    None   Social History Narrative    None     Social Drivers of Health     Financial Resource Strain: Not on file   Food Insecurity: Not on file   Transportation Needs: Not on file   Physical Activity: Not on file   Stress: Not on file   Social Connections: Not on file   Intimate Partner Violence: Not on file   Housing Stability: Not on  file        Review of Systems   Constitutional: Negative.    HENT: Negative.     Eyes: Negative.    Respiratory: Negative.     Cardiovascular: Negative.    Endocrine: Negative.    Musculoskeletal: Negative.    Neurological: Negative.    Hematological: Negative.    Psychiatric/Behavioral: Negative.           Objective:     Physical Exam  Vitals reviewed.   Constitutional:       Appearance: Normal appearance.   HENT:      Head: Normocephalic and atraumatic.      Nose: Nose normal.   Eyes:      Conjunctiva/sclera: Conjunctivae normal.      Pupils: Pupils are equal, round, and reactive to light.   Cardiovascular:      Pulses:           Dorsalis pedis pulses are 2+ on the right side.        Posterior tibial pulses are 2+ on the right side.   Pulmonary:      Effort: Pulmonary effort is normal.   Musculoskeletal:        Feet:    Feet:      Right foot:      Skin integrity: Callus present.      Comments: The patient's clinical examination today significant for mild tenderness with palpation to the area of the Lisfranc's joint over the dorsal aspect of the right midfoot.  No observable erythema nor edema no calor nor ecchymosis noted today.  Clinical photos from after his initial injury in November 2024 were shown to me on his phone and did show redness and swelling over this area.  The patient does note mild tenderness with passive range of motion through Lisfranc's joint.  Tenderness also presents with prolonged periods of ambulation and weightbearing.  Very mild tenderness is also noted with deep palpation of the plantar medial tubercle of the right os calcis, consistent with plantar fasciitis.  There is no tenderness with lateral squeeze of the calcaneus.  No pulses palpable.  There is no tenderness palpation to the base of the right fifth metatarsal.  There is no erythema nor edema no counter ecchymosis to the lateral right midfoot.    There is also a mildly tender callus lesion to the plantar aspect of his right great  "toe.  Debridement of this lesion revealed pinpoint hematomas consistent with a plantar verruca.  Skin:     General: Skin is warm.      Capillary Refill: Capillary refill takes less than 2 seconds.   Neurological:      General: No focal deficit present.      Mental Status: He is alert and oriented to person, place, and time.   Psychiatric:         Mood and Affect: Mood normal.         Behavior: Behavior normal.         Thought Content: Thought content normal.         Lesion Destruction    Date/Time: 1/23/2025 10:45 AM    Performed by: Steven Amin DPM  Authorized by: Steven Amin DPM  [place] Protocol:  procedure performed by consultantConsent: Verbal consent obtained.  Risks and benefits: risks, benefits and alternatives were discussed  Consent given by: patient  Time out: Immediately prior to procedure a \"time out\" was called to verify the correct patient, procedure, equipment, support staff and site/side marked as required.  Timeout called at: 1/23/2025 10:45 AM.  Patient understanding: patient states understanding of the procedure being performed  Patient identity confirmed: verbally with patient and provided demographic data    Procedure Details - Lesion Destruction:     Number of Lesions:  1  Lesion 1:     Body area:  Lower extremity    Lower extremity location:  R big toe    Malignancy: benign lesion      Destruction method: chemical removal        "

## 2025-02-04 ENCOUNTER — TELEPHONE (OUTPATIENT)
Age: 45
End: 2025-02-04

## 2025-02-04 NOTE — TELEPHONE ENCOUNTER
Caller: Robert Tellez    Doctor: Dr. Amin    Reason for call: Robert has an MRI scheduled for Monday.  Can we do a force on after the MRI so he can discuss the results with Dr. Amin?  Thank you    Call back#: [phone]

## 2025-02-10 ENCOUNTER — HOSPITAL ENCOUNTER (OUTPATIENT)
Dept: MRI IMAGING | Facility: HOSPITAL | Age: 45
Discharge: HOME | End: 2025-02-10
Attending: PODIATRIST
Payer: COMMERCIAL

## 2025-02-10 DIAGNOSIS — M79.671 RIGHT FOOT PAIN: ICD-10-CM

## 2025-02-10 DIAGNOSIS — S93.324S LISFRANC DISLOCATION, RIGHT, SEQUELA: ICD-10-CM

## 2025-02-10 PROCEDURE — 73718 MRI LOWER EXTREMITY W/O DYE: CPT

## 2025-02-11 DIAGNOSIS — M54.16 LUMBAR RADICULOPATHY: Primary | ICD-10-CM

## 2025-02-11 NOTE — PROGRESS NOTES
S/p Left L3,4, 45 TFESI completed 10/4/24 will repeat TFESI since he had 80% relief for 3 months      The risks of the procedure were discussed in detail.  These risks include infection, increased pain, paralysis, bleeding.  Patient understands the risks and is willing to pursue with the procedure

## 2025-02-12 ENCOUNTER — TELEPHONE (OUTPATIENT)
Dept: RADIOLOGY | Facility: HOSPITAL | Age: 45
End: 2025-02-12

## 2025-02-17 ENCOUNTER — OFFICE VISIT (OUTPATIENT)
Dept: PODIATRY | Facility: CLINIC | Age: 45
End: 2025-02-17
Payer: COMMERCIAL

## 2025-02-17 VITALS — OXYGEN SATURATION: 97 % | HEART RATE: 68 BPM | WEIGHT: 190 LBS | BODY MASS INDEX: 28.79 KG/M2 | HEIGHT: 68 IN

## 2025-02-17 DIAGNOSIS — B35.3 TINEA PEDIS OF BOTH FEET: ICD-10-CM

## 2025-02-17 DIAGNOSIS — B07.0 VERRUCA PLANTARIS: ICD-10-CM

## 2025-02-17 DIAGNOSIS — S92.351A CLOSED FRACTURE OF BASE OF FIFTH METATARSAL BONE OF RIGHT FOOT, INITIAL ENCOUNTER: Primary | ICD-10-CM

## 2025-02-17 PROCEDURE — 99213 OFFICE O/P EST LOW 20 MIN: CPT | Performed by: PODIATRIST

## 2025-02-17 PROCEDURE — 17110 DESTRUCTION B9 LES UP TO 14: CPT | Performed by: PODIATRIST

## 2025-02-17 RX ORDER — KETOCONAZOLE 20 MG/G
CREAM TOPICAL DAILY
Qty: 60 G | Refills: 3 | Status: SHIPPED | OUTPATIENT
Start: 2025-02-17

## 2025-02-17 NOTE — PROGRESS NOTES
:  Assessment & Plan  Tinea pedis of both feet    Orders:    ketoconazole (NIZORAL) 2 % cream; Apply topically daily    Closed fracture of base of fifth metatarsal bone of right foot, initial encounter         Verruca plantaris           The patient's clinical examination today significant for minimal tenderness with palpation to the area of the Lisfranc's joint over the dorsal aspect of the right midfoot. No observable erythema nor edema no calor nor ecchymosis noted today. The patient does note more tenderness associate with the subacute fracture of his fifth metatarsal base.  Very mild tenderness is again noted with deep palpation of the plantar medial tubercle of the right os calcis, consistent with plantar fasciitis.  A verrucous lesion is noted to the plantar aspect of the right great toe that is much improved compared to his prior visit.  Pedal pulses are palpable. There is no tenderness palpation to the base of the right fifth metatarsal. There is no erythema nor edema nor calor, nor ecchymosis to the lateral right midfoot.  He does note some occasional areas of dry scaling skin with pruritus to his feet consistent with tinea.    The MRI report was reviewed and appreciated and the pertinent images reviewed with the patient.  He does have a subacute/chronic fracture to the base of the fifth metatarsal.  Lisfranc's joint and ligament are intact.    MRI findings were discussed with the patient in detail.  X-rays were reviewed once again of his fifth metatarsal base fracture.  He is now over 5 months out from his initial injury.  His pain is intermittent and does seem to be dependent on his level of activity.  I would recommend continue activities as tolerated with adding a supportive OTC arch support in his shoes.  If this continues to be an issue, we would need to consider open reduction with internal fixation of the subacute fifth metatarsal fracture site.    The verrucous lesion was debrided and treated with  Cantharone for chemical ablation.  A dry sterile dressing was applied after treatment and is maintained for next 48 hours.    A prescription for ketoconazole 2% cream was sent to his pharmacy for management of tinea pedis.  It is to be applied daily to the affected area for 4 to 6 weeks as needed.    Recommend follow-up in 3 to 4 weeks for the plantar verruca.    History of Present Illness     Robert Tellez is a 44 y.o. male   Patient patient presents today for follow up of right midfoot pain.  He presents today to review his MRI results, as well as a wart to the plantar aspect of his right great toe.  He does note that he tolerated treatment with Cantharone well after his last visit.  Today, most of his discomfort is localized more to the base of his fifth metatarsal.  He does note that he recently purchased an arch support that he is wearing in his shoes for better support.      PAST MEDICAL HISTORY:  History reviewed. No pertinent past medical history.    PAST SURGICAL HISTORY:  History reviewed. No pertinent surgical history.     ALLERGIES:  Patient has no known allergies.    MEDICATIONS:  Current Outpatient Medications   Medication Sig Dispense Refill    ascorbic acid (VITAMIN C) 500 mg tablet Take 500 mg by mouth daily      b complex vitamins tablet Take 1 tablet by mouth daily      cholecalciferol (VITAMIN D3) 1,000 units tablet Take 1,000 Units by mouth daily      Diclofenac Sodium (VOLTAREN) 1 % Apply 2 g topically 4 (four) times a day (Patient taking differently: Apply 2 g topically if needed) 50 g 0    gabapentin (NEURONTIN) 300 mg capsule Take 1 capsule (300 mg total) by mouth if needed (pain) Take 1 tablet at bedtime for 3 days, then 1 tablet twice daily for 3 days, then 1 tablet 3 times daily 270 capsule 1    ketoconazole (NIZORAL) 2 % cream Apply topically daily 60 g 3    ketoconazole (NIZORAL) 2 % shampoo Apply 1 application topically daily (Patient taking differently: Apply 1 application. topically if  needed) 120 mL 1    methocarbamol (ROBAXIN) 500 mg tablet Take 1 tablet (500 mg total) by mouth daily at bedtime (Patient taking differently: Take 500 mg by mouth if needed) 90 tablet 0    Specialty Vitamins Products (BIOTIN PLUS KERATIN PO) Take by mouth in the morning      Turmeric POWD Use in the morning      clotrimazole-betamethasone (LOTRISONE) 1-0.05 % cream Apply topically 2 (two) times a day (Patient not taking: Reported on 9/21/2022) 30 g 0    meloxicam (Mobic) 7.5 mg tablet Take 1 tablet (7.5 mg total) by mouth daily (Patient taking differently: Take 7.5 mg by mouth if needed) 90 tablet 1    metoprolol tartrate (LOPRESSOR) 50 mg tablet Take 1 tablet (50 mg total) by mouth once for 1 dose Please take 2 hour before cardiac CTA (Patient not taking: Reported on 9/20/2024) 1 tablet 0     No current facility-administered medications for this visit.       SOCIAL HISTORY:  Social History     Socioeconomic History    Marital status: /Civil Union     Spouse name: None    Number of children: None    Years of education: None    Highest education level: None   Occupational History    None   Tobacco Use    Smoking status: Never    Smokeless tobacco: Never   Vaping Use    Vaping status: Never Used   Substance and Sexual Activity    Alcohol use: Not Currently    Drug use: Never    Sexual activity: Yes     Partners: Female   Other Topics Concern    None   Social History Narrative    None     Social Drivers of Health     Financial Resource Strain: Not on file   Food Insecurity: Not on file   Transportation Needs: Not on file   Physical Activity: Not on file   Stress: Not on file   Social Connections: Not on file   Intimate Partner Violence: Not on file   Housing Stability: Not on file      Review of Systems   Constitutional: Negative.    HENT: Negative.     Eyes: Negative.    Respiratory: Negative.     Cardiovascular: Negative.    Endocrine: Negative.    Musculoskeletal: Negative.    Neurological: Negative.   "  Hematological: Negative.    Psychiatric/Behavioral: Negative.       Objective   Pulse 68   Ht 5' 8\" (1.727 m)   Wt 86.2 kg (190 lb)   SpO2 97%   BMI 28.89 kg/m²      Physical Exam  Vitals and nursing note reviewed.   Constitutional:       General: He is not in acute distress.     Appearance: He is well-developed.   HENT:      Head: Normocephalic and atraumatic.   Eyes:      Conjunctiva/sclera: Conjunctivae normal.   Cardiovascular:      Rate and Rhythm: Normal rate and regular rhythm.      Pulses:           Dorsalis pedis pulses are 2+ on the right side.        Posterior tibial pulses are 2+ on the right side.      Heart sounds: No murmur heard.  Pulmonary:      Effort: Pulmonary effort is normal. No respiratory distress.      Breath sounds: Normal breath sounds.   Abdominal:      Palpations: Abdomen is soft.      Tenderness: There is no abdominal tenderness.   Musculoskeletal:         General: No swelling.      Cervical back: Neck supple.        Feet:    Feet:      Right foot:      Skin integrity: Skin integrity normal.      Comments: The patient's clinical examination today significant for minimal tenderness with palpation to the area of the Lisfranc's joint over the dorsal aspect of the right midfoot. No observable erythema nor edema no calor nor ecchymosis noted today. The patient does note more tenderness associate with the subacute fracture of his fifth metatarsal base.  Very mild tenderness is again noted with deep palpation of the plantar medial tubercle of the right os calcis, consistent with plantar fasciitis.  A verrucous lesion is noted to the plantar aspect of the right great toe that is much improved compared to his prior visit.  Pedal pulses are palpable. There is no tenderness palpation to the base of the right fifth metatarsal. There is no erythema nor edema nor calor, nor ecchymosis to the lateral right midfoot.   Skin:     General: Skin is warm and dry.      Capillary Refill: Capillary " "refill takes less than 2 seconds.   Neurological:      Mental Status: He is alert.   Psychiatric:         Mood and Affect: Mood normal.     Lesion Destruction    Date/Time: 2/17/2025 1:15 PM    Performed by: Steven Amin DPM  Authorized by: Steven Amin DPM  [place] Protocol:  procedure performed by consultantConsent: Verbal consent obtained.  Risks and benefits: risks, benefits and alternatives were discussed  Consent given by: patient  Time out: Immediately prior to procedure a \"time out\" was called to verify the correct patient, procedure, equipment, support staff and site/side marked as required.  Timeout called at: 2/17/2025 1:15 PM.  Patient understanding: patient states understanding of the procedure being performed  Patient identity confirmed: verbally with patient and provided demographic data    Procedure Details - Lesion Destruction:     Number of Lesions:  1  Lesion 1:     Body area:  Lower extremity    Lower extremity location:  R big toe    Malignancy: benign lesion      Destruction method: chemical removal            "

## 2025-02-20 ENCOUNTER — TELEPHONE (OUTPATIENT)
Dept: PODIATRY | Facility: CLINIC | Age: 45
End: 2025-02-20

## 2025-02-20 ENCOUNTER — HOSPITAL ENCOUNTER (OUTPATIENT)
Dept: RADIOLOGY | Facility: HOSPITAL | Age: 45
Discharge: HOME | End: 2025-02-20
Attending: PAIN MEDICINE
Payer: COMMERCIAL

## 2025-02-20 VITALS
DIASTOLIC BLOOD PRESSURE: 74 MMHG | TEMPERATURE: 98.5 F | HEART RATE: 82 BPM | RESPIRATION RATE: 17 BRPM | SYSTOLIC BLOOD PRESSURE: 135 MMHG | OXYGEN SATURATION: 95 %

## 2025-02-20 DIAGNOSIS — M54.16 LUMBAR RADICULOPATHY: ICD-10-CM

## 2025-02-20 PROCEDURE — 64484 NJX AA&/STRD TFRM EPI L/S EA: CPT | Performed by: PAIN MEDICINE

## 2025-02-20 PROCEDURE — 64483 NJX AA&/STRD TFRM EPI L/S 1: CPT | Performed by: PAIN MEDICINE

## 2025-02-20 RX ORDER — PAPAVERINE HCL 150 MG
15 CAPSULE, EXTENDED RELEASE ORAL ONCE
Status: COMPLETED | OUTPATIENT
Start: 2025-02-20 | End: 2025-02-20

## 2025-02-20 RX ORDER — BUPIVACAINE HCL/PF 2.5 MG/ML
2 VIAL (ML) INJECTION ONCE
Status: COMPLETED | OUTPATIENT
Start: 2025-02-20 | End: 2025-02-20

## 2025-02-20 RX ADMIN — IOHEXOL 2 ML: 300 INJECTION, SOLUTION INTRAVENOUS at 11:19

## 2025-02-20 RX ADMIN — DEXAMETHASONE SODIUM PHOSPHATE 15 MG: 10 INJECTION, SOLUTION INTRAMUSCULAR; INTRAVENOUS at 11:19

## 2025-02-20 RX ADMIN — BUPIVACAINE HYDROCHLORIDE 2 ML: 2.5 INJECTION, SOLUTION EPIDURAL; INFILTRATION; INTRACAUDAL at 11:19

## 2025-02-20 NOTE — DISCHARGE INSTR - LAB
Epidural Steroid Injection   WHAT YOU NEED TO KNOW:   An epidural steroid injection ([facility]) is a procedure to inject steroid medicine into the epidural space. The epidural space is between your spinal cord and vertebrae. Steroids reduce inflammation and fluid buildup in your spine that may be causing pain. You may be given pain medicine along with the steroids.          ACTIVITY  Do not drive or operate machinery today.  No strenuous activity today - bending, lifting, etc.  You may resume normal activites starting tomorrow - start slowly and as tolerated.  You may shower today, but no tub baths or hot tubs.  You may have numbness for several hours from the local anesthetic. Please use caution and common sense, especially with weight-bearing activities.    CARE OF THE INJECTION SITE  If you have soreness or pain, apply ice to the area today (20 minutes on/20 minutes off).  Starting tomorrow, you may use warm, moist heat or ice if needed.  You may have an increase or change in your discomfort for 36-48 hours after your treatment.  Apply ice and continue with any pain medication you have been prescribed.  Notify the [facility] if you have any of the following: redness, drainage, swelling, headache, stiff neck or fever above 100°F.    SPECIAL INSTRUCTIONS  Our office will contact you in approximately 14 days for a progress report.    MEDICATIONS  Continue to take all routine medications.  Our office may have instructed you to hold some medications.    As no general anesthesia was used in today's procedure, you should not experience any side effects related to anesthesia.     If you are diabetic, the steroids used in today's injection may temporarily increase your blood sugar levels after the first few days after your injection. Please keep a close eye on your sugars and alert the doctor who manages your diabetes if your sugars are significantly high from your baseline or you are symptomatic.     If you have a  problem specifically related to your procedure, please call our office at [phone].  Problems not related to your procedure should be directed to your primary care physician.

## 2025-02-20 NOTE — TELEPHONE ENCOUNTER
Patient called the RX Refill Line. Message is being forwarded to the office.     Patient stated the pharmacy needs to speak to someone regarding te ketoconazole prescription. The dose needs to be changed they stated.     Please contact patient at [phone]

## 2025-02-20 NOTE — TELEPHONE ENCOUNTER
Pharmacy called the RX Refill Line. Message is being forwarded to the office.     Pharmacy said prescription needs to state specific gram per location and application.     Please contact pharmacy at  [phone]

## 2025-02-20 NOTE — H&P
History of Present Illness: The patient is a 44 y.o. male who presents with complaints of low back and left leg pain    No past medical history on file.    No past surgical history on file.      Current Outpatient Medications:     ascorbic acid (VITAMIN C) 500 mg tablet, Take 500 mg by mouth daily, Disp: , Rfl:     b complex vitamins tablet, Take 1 tablet by mouth daily, Disp: , Rfl:     cholecalciferol (VITAMIN D3) 1,000 units tablet, Take 1,000 Units by mouth daily, Disp: , Rfl:     clotrimazole-betamethasone (LOTRISONE) 1-0.05 % cream, Apply topically 2 (two) times a day (Patient not taking: Reported on 9/21/2022), Disp: 30 g, Rfl: 0    Diclofenac Sodium (VOLTAREN) 1 %, Apply 2 g topically 4 (four) times a day (Patient taking differently: Apply 2 g topically if needed), Disp: 50 g, Rfl: 0    gabapentin (NEURONTIN) 300 mg capsule, Take 1 capsule (300 mg total) by mouth if needed (pain) Take 1 tablet at bedtime for 3 days, then 1 tablet twice daily for 3 days, then 1 tablet 3 times daily, Disp: 270 capsule, Rfl: 1    ketoconazole (NIZORAL) 2 % cream, Apply topically daily, Disp: 60 g, Rfl: 3    ketoconazole (NIZORAL) 2 % shampoo, Apply 1 application topically daily (Patient taking differently: Apply 1 application. topically if needed), Disp: 120 mL, Rfl: 1    meloxicam (Mobic) 7.5 mg tablet, Take 1 tablet (7.5 mg total) by mouth daily (Patient taking differently: Take 7.5 mg by mouth if needed), Disp: 90 tablet, Rfl: 1    methocarbamol (ROBAXIN) 500 mg tablet, Take 1 tablet (500 mg total) by mouth daily at bedtime (Patient taking differently: Take 500 mg by mouth if needed), Disp: 90 tablet, Rfl: 0    metoprolol tartrate (LOPRESSOR) 50 mg tablet, Take 1 tablet (50 mg total) by mouth once for 1 dose Please take 2 hour before cardiac CTA (Patient not taking: Reported on 9/20/2024), Disp: 1 tablet, Rfl: 0    Specialty Vitamins Products (BIOTIN PLUS KERATIN PO), Take by mouth in the morning, Disp: , Rfl:     Turmeric  POWD, Use in the morning, Disp: , Rfl:     No Known Allergies    Physical Exam:   Vitals:    02/20/25 1053   BP: 147/76   Pulse: 78   Resp: 17   Temp: 98.5 °F (36.9 °C)   SpO2: 98%     General: Awake, Alert, Oriented x 3, Mood and affect appropriate  Respiratory: Respirations even and unlabored  Cardiovascular: Peripheral pulses intact; no edema  Musculoskeletal Exam: + SLR left    ASA Score: 2    Patient/Chart Verification  Patient ID Verified: Verbal  ID Band Applied: No  Consents Confirmed: Blood products  H&P( within 30 days) Verified: To be obtained in the Procedural area  Allergies Reviewed: Yes  Anticoag/NSAID held?: NA  Currently on antibiotics?: No    Assessment:   1. Lumbar radiculopathy        Plan: left L3-4, 4-5 TFESI      The risks of the procedure were discussed in detail.  These risks include infection, increased pain, paralysis, bleeding.  Patient understands the risks and is willing to pursue with the procedure

## 2025-02-27 DIAGNOSIS — B35.3 TINEA PEDIS OF BOTH FEET: ICD-10-CM

## 2025-02-27 RX ORDER — KETOCONAZOLE 20 MG/G
CREAM TOPICAL DAILY
Qty: 60 G | Refills: 3 | Status: SHIPPED | OUTPATIENT
Start: 2025-02-27

## 2025-03-06 ENCOUNTER — TELEPHONE (OUTPATIENT)
Dept: PAIN MEDICINE | Facility: MEDICAL CENTER | Age: 45
End: 2025-03-06

## 2025-03-06 NOTE — TELEPHONE ENCOUNTER
Caller: raphael Jones  Doctor/office: Dr LINA SORIA#: [phone]    % of improvement: 75  Pain Scale (1-10): 3